# Patient Record
Sex: FEMALE | Race: WHITE | NOT HISPANIC OR LATINO | Employment: FULL TIME | URBAN - METROPOLITAN AREA
[De-identification: names, ages, dates, MRNs, and addresses within clinical notes are randomized per-mention and may not be internally consistent; named-entity substitution may affect disease eponyms.]

---

## 2017-04-26 ENCOUNTER — LAB REQUISITION (OUTPATIENT)
Dept: LAB | Facility: HOSPITAL | Age: 47
End: 2017-04-26
Payer: COMMERCIAL

## 2017-04-26 DIAGNOSIS — D64.9 ANEMIA: ICD-10-CM

## 2017-04-26 LAB
IRON SATN MFR SERPL: 25 %
IRON SERPL-MCNC: 86 UG/DL (ref 50–170)
TIBC SERPL-MCNC: 347 UG/DL (ref 250–450)

## 2017-04-26 PROCEDURE — 83550 IRON BINDING TEST: CPT | Performed by: INTERNAL MEDICINE

## 2017-04-26 PROCEDURE — 83516 IMMUNOASSAY NONANTIBODY: CPT | Performed by: INTERNAL MEDICINE

## 2017-04-26 PROCEDURE — 83540 ASSAY OF IRON: CPT | Performed by: INTERNAL MEDICINE

## 2017-04-26 PROCEDURE — 82784 ASSAY IGA/IGD/IGG/IGM EACH: CPT | Performed by: INTERNAL MEDICINE

## 2017-04-26 PROCEDURE — 86255 FLUORESCENT ANTIBODY SCREEN: CPT | Performed by: INTERNAL MEDICINE

## 2017-04-28 LAB
ENDOMYSIUM IGA SER QL: NEGATIVE
GLIADIN PEPTIDE IGA SER-ACNC: 3 UNITS (ref 0–19)
GLIADIN PEPTIDE IGG SER-ACNC: 2 UNITS (ref 0–19)
IGA SERPL-MCNC: 125 MG/DL (ref 87–352)
TTG IGA SER-ACNC: <2 U/ML (ref 0–3)
TTG IGG SER-ACNC: <2 U/ML (ref 0–5)

## 2017-05-23 PROCEDURE — 88305 TISSUE EXAM BY PATHOLOGIST: CPT | Performed by: INTERNAL MEDICINE

## 2017-05-24 ENCOUNTER — LAB REQUISITION (OUTPATIENT)
Dept: LAB | Facility: HOSPITAL | Age: 47
End: 2017-05-24
Payer: COMMERCIAL

## 2017-05-24 DIAGNOSIS — D64.9 ANEMIA: ICD-10-CM

## 2017-05-24 DIAGNOSIS — D12.4 BENIGN NEOPLASM OF DESCENDING COLON: ICD-10-CM

## 2017-05-24 DIAGNOSIS — K22.89 OTHER SPECIFIED DISEASES OF ESOPHAGUS: ICD-10-CM

## 2017-05-24 DIAGNOSIS — Z80.0 FAMILY HISTORY OF MALIGNANT NEOPLASM OF DIGESTIVE ORGAN: ICD-10-CM

## 2017-06-01 DIAGNOSIS — R92.8 OTHER ABNORMAL AND INCONCLUSIVE FINDINGS ON DIAGNOSTIC IMAGING OF BREAST: ICD-10-CM

## 2017-06-21 ENCOUNTER — TRANSCRIBE ORDERS (OUTPATIENT)
Dept: MAMMOGRAPHY | Facility: CLINIC | Age: 47
End: 2017-06-21

## 2017-06-21 ENCOUNTER — HOSPITAL ENCOUNTER (OUTPATIENT)
Dept: MAMMOGRAPHY | Facility: CLINIC | Age: 47
Discharge: HOME | End: 2017-06-21
Payer: COMMERCIAL

## 2017-06-21 DIAGNOSIS — N63.0 LUMP OR MASS IN BREAST: Primary | ICD-10-CM

## 2017-06-21 DIAGNOSIS — R92.8 OTHER ABNORMAL AND INCONCLUSIVE FINDINGS ON DIAGNOSTIC IMAGING OF BREAST: ICD-10-CM

## 2017-06-21 PROCEDURE — G0206 DX MAMMO INCL CAD UNI: HCPCS

## 2017-06-21 PROCEDURE — G0279 TOMOSYNTHESIS, MAMMO: HCPCS

## 2017-06-26 ENCOUNTER — GENERIC CONVERSION - ENCOUNTER (OUTPATIENT)
Dept: OTHER | Facility: OTHER | Age: 47
End: 2017-06-26

## 2017-11-29 ENCOUNTER — ALLSCRIPTS OFFICE VISIT (OUTPATIENT)
Dept: OTHER | Facility: OTHER | Age: 47
End: 2017-11-29

## 2017-11-30 NOTE — PROGRESS NOTES
Assessment    1  Encounter for gynecological examination () (Z01 419)    Discussion/Summary    The patient was informed of a stable gyn examination  She will continue to monitor her menstrual headaches  She may be a candidate for a laparoscopic oophorectomy for surgical menopause she may consider  Strongly of asked her to use her medication we the Fioricet or Percocet prior to onset of menses for prophylaxis  Her next mammogram should be and  she will need a 3D mammogram at that time  The patient has the current Goals: To continue to monitor menstrual headaches, to use prophylactic headache medication  To consider prophylactic bilateral surgical oophorectomy for menstrual headaches  The patent has the current Barriers: There are no barriers  Patient is able to Self-Care  Chief Complaint  Annual visit      History of Present Illness  HPI: This is a 59-year-old white female, she is a  2 para 2 with 2 prior vaginal deliveries  Her current method of contraception includes tubal ligation  She has a history of abnormal uterine bleeding this is treated by an endometrial ablation  Her cycles are now very light spotting  She does have a problem with menstrual headaches  She was seen by the neurologist yesterday  She is currently taking Fioricet and Percocet for headaches  We had a discussion about the possibility of laparoscopic removal of her ovaries to and induce a surgical menopause to help with menstrual headaches she may consider  There are no new major family illnesses report this time  There is no problem with intimacy  She denies any major  GI complaint  Upon further questioning she does admit to slight stress incontinence this is not interfering with her lifestyle  She will need to have another mammogram within the next 6 months  GYN HM, Adult Female [facility]: The patient is being seen for a gynecology evaluation  General Health:  The patient's health since the last visit is described as good  She has regular dental visits  -- She denies vision problems  -- She denies hearing loss  Lifestyle:  She consumes a diverse and healthy diet  -- She does not have any weight concerns  -- She exercises regularly  -- She uses tobacco  The patient is a former cigarette smoker  -- She consumes alcohol -- She denies drug use  Reproductive health: the patient is premenopausal--   she reports no menstrual problems  -- she uses contraception  For contraception, she has had a tubal occlusion  -- she is sexually active  Screening:      Review of Systems  Additional findings include Menstrual headaches  Constitutional: No fever, no chills, feels well, no tiredness, no recent weight gain or loss  ENT: no ear ache, no loss of hearing, no nosebleeds or nasal discharge, no sore throat or hoarseness  Cardiovascular: no complaints of slow or fast heart rate, no chest pain, no palpitations, no leg claudication or lower extremity edema  Respiratory: no complaints of shortness of breath, no wheezing, no dyspnea on exertion, no orthopnea or PND  Breasts: no complaints of breast pain, breast lump or nipple discharge  Gastrointestinal: no complaints of abdominal pain, no constipation, no nausea or diarrhea, no vomiting, no bloody stools  Genitourinary: no complaints of dysuria, no incontinence, no pelvic pain, no dysmenorrhea, no vaginal discharge or abnormal vaginal bleeding  Musculoskeletal: no complaints of arthralgia, no myalgia, no joint swelling or stiffness, no limb pain or swelling  Integumentary: no complaints of skin rash or lesion, no itching or dry skin, no skin wounds  Neurological: no complaints of headache, no confusion, no numbness or tingling, no dizziness or fainting  Over the past 2 weeks, how often have you been bothered by the following problems? 1 ) Little interest or pleasure in doing things? Not at all   2 ) Feeling down, depressed or hopeless?  Not at all   3 ) Trouble falling asleep or sleeping too much? Not at all   4 ) Feeling tired or having little energy? Not at all   5 ) Poor appetite or overeating? Not at all   6 ) Feeling bad about yourself, or that you are a failure, or have let yourself or your family down? Not at all   7 ) Trouble concentrating on things, such as reading a newspaper or watching television? Not at all   8 ) Moving or speaking so slowly that other people could have noticed, or the opposite, moving or speaking faster than usual? Not at all  How difficult have these problems made it for you to do your work, take care of things at home, or get along with people? Not at all  Score       OB History  Pregnancy History (Brief):  Prior pregnancies: : 2  Para: 2 (full-term)-- and-- 2 (living)  Delivery type: 2 vaginal       Active Problems  1  Abnormal findings on diagnostic imaging of breast (793 89) (R92 8)   2  Benign hypertension (401 1) (I10)   3  Encounter for gynecological examination (V72 31) (Z01 419)   4  Encounter for screening mammogram for breast cancer (V76 12) (Z12 31)   5  History of Hashimoto thyroiditis (V12 29) (Z86 39)   6   History of headache (V13 89) (Z87 898)    Surgical History   · History of Facial Surgery   · History of Hysteroscopy With Endometrial Ablation   · History of Thyroid Surgery Total Thyroidectomy   · History of Tubal Ligation    Family History  Mother    · Family history of abdominal aortic aneurysm (AAA) (V17 49) (Z82 49)   · Family history of arthritis (V17 7) (Z82 61)   · Family history of fibromyalgia (V17 89) (Z82 69)   · Family history of hypertension (V17 49) (Z82 49)   · Family history of osteoporosis (V17 81) (Z82 62)  Father    · Family history of GERD (V18 59) (Z83 79)   · Family history of Ulcer  Grandmother    · Family history of alcoholism (V17 0) (Z81 1)   · Family history of cancer (V16 9) (Z80 9)   · Family history of hepatic cirrhosis (V18 59) (Z83 79)   · Family history of stroke (V17 1) (Z82 3)  Grandfather    · Family history of heart attack (V17 3) (Z82 49)   · Family history of heart disease (V17 49) (Z82 49)   · Family history of skin cancer (V16 8) (Z80 8)   · Family history of stroke (V17 1) (Z82 3)    Social History     · Former smoker (V15 82) (O81 041)    Current Meds   1  Fioricet TABS; Therapy: (Recorded:02Nov2016) to Recorded   2  Levothyroxine Sodium TABS; Therapy: (Recorded:02Nov2016) to Recorded   3  Losartan Potassium 25 MG Oral Tablet; Therapy: (Recorded:02Nov2016) to Recorded   4  Percocet TABS; Therapy: (Recorded:02Nov2016) to Recorded   5  Restasis 0 05 % Ophthalmic Emulsion; Therapy: (Recorded:02Nov2016) to Recorded   6  Zanaflex TABS; Therapy: (Recorded:02Nov2016) to Recorded    Allergies  1  Levaquin TABS   2  Penicillins    Vitals   Recorded: [id number] 49:43KT   Systolic 405   Diastolic 60   Height 5 ft    Weight 109 lb 6 08 oz   BMI Calculated 21 36   BSA Calculated 1 44   LMP 23-Nov-2017       Physical Exam   Constitutional  General appearance: No acute distress, well appearing and well nourished  Neck  Neck: Normal, supple, trachea midline, no masses  Thyroid: Normal, no thyromegaly  Pulmonary  Respiratory effort: No increased work of breathing or signs of respiratory distress  Auscultation of lungs: Clear to auscultation  Cardiovascular  Auscultation of heart: Normal rate and rhythm, normal S1 and S2, no murmurs  Peripheral vascular exam: Normal pulses Throughout  Genitourinary  External genitalia: Normal and no lesions appreciated  Vagina: Normal, no lesions or dryness appreciated  Urethra: Normal    Urethral meatus: Normal    Bladder: Normal, soft, non-tender and no prolapse or masses appreciated  Cervix: Normal, no palpable masses  Uterus: Normal, non-tender, not enlarged, and no palpable masses  Adnexa/parametria: Normal, non-tender and no fullness or masses appreciated  Chest  Breasts: Normal and no dimpling or skin changes noted     Abdomen Abdomen: Normal, non-tender, and no organomegaly noted  Liver and spleen: No hepatomegaly or splenomegaly  Examination for hernias: No hernias appreciated  Lymphatic  Palpation of lymph nodes in neck, axillae, groin and/or other locations: No lymphadenopathy or masses noted  Skin  Skin and subcutaneous tissue: Normal skin turgor and no rashes     Palpation of skin and subcutaneous tissue: Normal    Psychiatric  Orientation to person, place, and time: Normal    Mood and affect: Normal        Signatures   Electronically signed by : DENIS Salgado ; Nov 29 2017  2:53PM EST                       (Author)

## 2017-12-01 DIAGNOSIS — Z12.31 ENCOUNTER FOR SCREENING MAMMOGRAM FOR MALIGNANT NEOPLASM OF BREAST: ICD-10-CM

## 2017-12-01 DIAGNOSIS — R92.8 OTHER ABNORMAL AND INCONCLUSIVE FINDINGS ON DIAGNOSTIC IMAGING OF BREAST: ICD-10-CM

## 2018-01-03 ENCOUNTER — HOSPITAL ENCOUNTER (OUTPATIENT)
Dept: MAMMOGRAPHY | Facility: CLINIC | Age: 48
Discharge: HOME | End: 2018-01-03
Payer: COMMERCIAL

## 2018-01-03 ENCOUNTER — GENERIC CONVERSION - ENCOUNTER (OUTPATIENT)
Dept: OTHER | Facility: OTHER | Age: 48
End: 2018-01-03

## 2018-01-03 ENCOUNTER — HOSPITAL ENCOUNTER (OUTPATIENT)
Dept: ULTRASOUND IMAGING | Facility: CLINIC | Age: 48
Discharge: HOME | End: 2018-01-03

## 2018-01-03 ENCOUNTER — TRANSCRIBE ORDERS (OUTPATIENT)
Dept: MAMMOGRAPHY | Facility: CLINIC | Age: 48
End: 2018-01-03

## 2018-01-03 DIAGNOSIS — R92.2 INCONCLUSIVE MAMMOGRAPHY: ICD-10-CM

## 2018-01-03 DIAGNOSIS — Z12.31 ENCOUNTER FOR SCREENING MAMMOGRAM FOR MALIGNANT NEOPLASM OF BREAST: ICD-10-CM

## 2018-01-03 DIAGNOSIS — R92.8 OTHER ABNORMAL AND INCONCLUSIVE FINDINGS ON DIAGNOSTIC IMAGING OF BREAST: ICD-10-CM

## 2018-01-03 DIAGNOSIS — R92.8 ABNORMAL MAMMOGRAM: Primary | ICD-10-CM

## 2018-01-03 PROCEDURE — 76642 ULTRASOUND BREAST LIMITED: CPT

## 2018-01-03 PROCEDURE — 77066 DX MAMMO INCL CAD BI: CPT

## 2018-01-03 PROCEDURE — G0279 TOMOSYNTHESIS, MAMMO: HCPCS

## 2018-01-14 VITALS
DIASTOLIC BLOOD PRESSURE: 60 MMHG | SYSTOLIC BLOOD PRESSURE: 110 MMHG | BODY MASS INDEX: 21.47 KG/M2 | HEIGHT: 60 IN | WEIGHT: 109.38 LBS

## 2018-05-02 ENCOUNTER — TRANSCRIBE ORDERS (OUTPATIENT)
Dept: ADMINISTRATIVE | Facility: HOSPITAL | Age: 48
End: 2018-05-02

## 2018-05-02 DIAGNOSIS — S53.22XA TRAUMATIC RUPTURE OF RADIAL COLLATERAL LIGAMENT OF LEFT ELBOW, INITIAL ENCOUNTER: Primary | ICD-10-CM

## 2018-05-12 ENCOUNTER — HOSPITAL ENCOUNTER (OUTPATIENT)
Dept: MRI IMAGING | Facility: HOSPITAL | Age: 48
Discharge: HOME | End: 2018-05-12
Payer: COMMERCIAL

## 2018-05-12 DIAGNOSIS — S53.22XA TRAUMATIC RUPTURE OF RADIAL COLLATERAL LIGAMENT OF LEFT ELBOW, INITIAL ENCOUNTER: ICD-10-CM

## 2018-05-12 PROCEDURE — 73218 MRI UPPER EXTREMITY W/O DYE: CPT

## 2018-07-11 ENCOUNTER — HOSPITAL ENCOUNTER (OUTPATIENT)
Dept: ULTRASOUND IMAGING | Facility: CLINIC | Age: 48
Discharge: HOME | End: 2018-07-11
Payer: COMMERCIAL

## 2018-07-11 ENCOUNTER — HOSPITAL ENCOUNTER (OUTPATIENT)
Dept: MAMMOGRAPHY | Facility: CLINIC | Age: 48
Discharge: HOME | End: 2018-07-11
Payer: COMMERCIAL

## 2018-07-11 ENCOUNTER — TRANSCRIBE ORDERS (OUTPATIENT)
Dept: MAMMOGRAPHY | Facility: CLINIC | Age: 48
End: 2018-07-11

## 2018-07-11 DIAGNOSIS — R92.8 ABNORMAL MAMMOGRAM: ICD-10-CM

## 2018-07-11 DIAGNOSIS — R92.8 ABNORMAL MAMMOGRAM: Primary | ICD-10-CM

## 2018-07-11 PROCEDURE — 77065 DX MAMMO INCL CAD UNI: CPT

## 2018-07-11 PROCEDURE — G0279 TOMOSYNTHESIS, MAMMO: HCPCS

## 2018-07-11 PROCEDURE — 76642 ULTRASOUND BREAST LIMITED: CPT

## 2019-02-06 ENCOUNTER — HOSPITAL ENCOUNTER (OUTPATIENT)
Dept: MAMMOGRAPHY | Facility: CLINIC | Age: 49
Discharge: HOME | End: 2019-02-06
Payer: COMMERCIAL

## 2019-02-06 ENCOUNTER — TRANSCRIBE ORDERS (OUTPATIENT)
Dept: MAMMOGRAPHY | Facility: CLINIC | Age: 49
End: 2019-02-06

## 2019-02-06 ENCOUNTER — HOSPITAL ENCOUNTER (OUTPATIENT)
Dept: ULTRASOUND IMAGING | Facility: CLINIC | Age: 49
Discharge: HOME | End: 2019-02-06
Payer: COMMERCIAL

## 2019-02-06 VITALS — BODY MASS INDEX: 20.81 KG/M2 | HEIGHT: 60 IN | WEIGHT: 106 LBS

## 2019-02-06 DIAGNOSIS — R92.8 ABNORMAL MAMMOGRAM: ICD-10-CM

## 2019-02-06 DIAGNOSIS — Z12.39 SCREENING BREAST EXAMINATION: Primary | ICD-10-CM

## 2019-02-06 PROCEDURE — G0279 TOMOSYNTHESIS, MAMMO: HCPCS

## 2019-02-06 PROCEDURE — 76642 ULTRASOUND BREAST LIMITED: CPT

## 2019-02-06 PROCEDURE — 77066 DX MAMMO INCL CAD BI: CPT

## 2019-05-15 ENCOUNTER — ANNUAL EXAM (OUTPATIENT)
Dept: OBGYN CLINIC | Facility: CLINIC | Age: 49
End: 2019-05-15
Payer: COMMERCIAL

## 2019-05-15 VITALS
DIASTOLIC BLOOD PRESSURE: 82 MMHG | WEIGHT: 112.2 LBS | BODY MASS INDEX: 22.03 KG/M2 | SYSTOLIC BLOOD PRESSURE: 120 MMHG | HEIGHT: 60 IN

## 2019-05-15 DIAGNOSIS — Z12.39 BREAST CANCER SCREENING: ICD-10-CM

## 2019-05-15 DIAGNOSIS — R92.8 ABNORMAL MAMMOGRAM: ICD-10-CM

## 2019-05-15 DIAGNOSIS — Z01.419 WOMEN'S ANNUAL ROUTINE GYNECOLOGICAL EXAMINATION: Primary | ICD-10-CM

## 2019-05-15 PROCEDURE — 99396 PREV VISIT EST AGE 40-64: CPT | Performed by: OBSTETRICS & GYNECOLOGY

## 2019-05-15 PROCEDURE — 87624 HPV HI-RISK TYP POOLED RSLT: CPT | Performed by: OBSTETRICS & GYNECOLOGY

## 2019-05-15 PROCEDURE — G0145 SCR C/V CYTO,THINLAYER,RESCR: HCPCS | Performed by: OBSTETRICS & GYNECOLOGY

## 2019-05-15 RX ORDER — OXYCODONE AND ACETAMINOPHEN 10; 325 MG/1; MG/1
TABLET ORAL
COMMUNITY

## 2019-05-15 RX ORDER — TIZANIDINE 4 MG/1
TABLET ORAL
COMMUNITY

## 2019-05-15 RX ORDER — PREDNISONE 20 MG/1
TABLET ORAL
Refills: 0 | COMMUNITY
Start: 2019-05-09

## 2019-05-15 RX ORDER — CYCLOSPORINE 0.5 MG/ML
EMULSION OPHTHALMIC
COMMUNITY

## 2019-05-15 RX ORDER — LEVOTHYROXINE SODIUM 112 UG/1
112 TABLET ORAL DAILY
Refills: 3 | COMMUNITY
Start: 2019-02-27

## 2019-05-15 RX ORDER — LOSARTAN POTASSIUM 25 MG/1
100 TABLET ORAL
COMMUNITY

## 2019-05-15 RX ORDER — ZOLMITRIPTAN 5 MG/1
SPRAY NASAL
COMMUNITY

## 2019-05-15 RX ORDER — BUTALBITAL, ACETAMINOPHEN AND CAFFEINE 50; 325; 40 MG/1; MG/1; MG/1
TABLET ORAL
COMMUNITY

## 2019-05-17 LAB
HPV HR 12 DNA CVX QL NAA+PROBE: NEGATIVE
HPV16 DNA CVX QL NAA+PROBE: NEGATIVE
HPV18 DNA CVX QL NAA+PROBE: NEGATIVE

## 2019-05-21 LAB
LAB AP GYN PRIMARY INTERPRETATION: NORMAL
LAB AP LMP: NORMAL
Lab: NORMAL

## 2019-11-30 ENCOUNTER — APPOINTMENT (OUTPATIENT)
Dept: RADIOLOGY | Facility: CLINIC | Age: 49
End: 2019-11-30
Payer: COMMERCIAL

## 2019-11-30 ENCOUNTER — TRANSCRIBE ORDERS (OUTPATIENT)
Dept: ADMINISTRATIVE | Facility: HOSPITAL | Age: 49
End: 2019-11-30

## 2019-11-30 DIAGNOSIS — R05.9 COUGH: ICD-10-CM

## 2019-11-30 DIAGNOSIS — R05.9 COUGH: Primary | ICD-10-CM

## 2019-11-30 PROCEDURE — 71046 X-RAY EXAM CHEST 2 VIEWS: CPT

## 2020-06-03 ENCOUNTER — HOSPITAL ENCOUNTER (OUTPATIENT)
Dept: MAMMOGRAPHY | Facility: CLINIC | Age: 50
Discharge: HOME | End: 2020-06-03
Payer: COMMERCIAL

## 2020-06-03 ENCOUNTER — HOSPITAL ENCOUNTER (OUTPATIENT)
Dept: ULTRASOUND IMAGING | Facility: CLINIC | Age: 50
Discharge: HOME | End: 2020-06-03
Payer: COMMERCIAL

## 2020-06-03 VITALS — TEMPERATURE: 97.8 F | HEIGHT: 60 IN | BODY MASS INDEX: 21.99 KG/M2 | WEIGHT: 112 LBS

## 2020-06-03 DIAGNOSIS — R92.8 ABNORMAL MAMMOGRAM: ICD-10-CM

## 2020-06-03 DIAGNOSIS — Z12.39 BREAST CANCER SCREENING: ICD-10-CM

## 2020-06-03 PROCEDURE — G0279 TOMOSYNTHESIS, MAMMO: HCPCS

## 2020-06-03 PROCEDURE — 76642 ULTRASOUND BREAST LIMITED: CPT

## 2020-06-03 PROCEDURE — 77066 DX MAMMO INCL CAD BI: CPT

## 2020-10-28 ENCOUNTER — ANNUAL EXAM (OUTPATIENT)
Dept: OBGYN CLINIC | Facility: CLINIC | Age: 50
End: 2020-10-28
Payer: COMMERCIAL

## 2020-10-28 VITALS
SYSTOLIC BLOOD PRESSURE: 110 MMHG | HEIGHT: 60 IN | WEIGHT: 120 LBS | BODY MASS INDEX: 23.56 KG/M2 | DIASTOLIC BLOOD PRESSURE: 74 MMHG | TEMPERATURE: 98.2 F

## 2020-10-28 DIAGNOSIS — Z12.31 ENCOUNTER FOR SCREENING MAMMOGRAM FOR MALIGNANT NEOPLASM OF BREAST: ICD-10-CM

## 2020-10-28 DIAGNOSIS — Z01.419 WOMEN'S ANNUAL ROUTINE GYNECOLOGICAL EXAMINATION: Primary | ICD-10-CM

## 2020-10-28 PROCEDURE — 99396 PREV VISIT EST AGE 40-64: CPT | Performed by: OBSTETRICS & GYNECOLOGY

## 2020-10-28 RX ORDER — BUDESONIDE AND FORMOTEROL FUMARATE DIHYDRATE 160; 4.5 UG/1; UG/1
AEROSOL RESPIRATORY (INHALATION)
COMMUNITY

## 2020-10-28 RX ORDER — FREMANEZUMAB-VFRM 225 MG/1.5ML
1.5 INJECTION SUBCUTANEOUS
COMMUNITY

## 2020-10-28 RX ORDER — LEVALBUTEROL TARTRATE 45 UG/1
AEROSOL, METERED ORAL
COMMUNITY

## 2021-04-21 ENCOUNTER — TRANSCRIBE ORDERS (OUTPATIENT)
Dept: ADMINISTRATIVE | Facility: HOSPITAL | Age: 51
End: 2021-04-21

## 2021-04-21 ENCOUNTER — HOSPITAL ENCOUNTER (OUTPATIENT)
Dept: NON INVASIVE DIAGNOSTICS | Age: 51
Discharge: HOME | End: 2021-04-21
Payer: COMMERCIAL

## 2021-04-21 DIAGNOSIS — R60.9 EDEMA: ICD-10-CM

## 2021-04-21 DIAGNOSIS — R60.9 EDEMA: Primary | ICD-10-CM

## 2021-04-21 PROCEDURE — 93970 EXTREMITY STUDY: CPT | Performed by: SURGERY

## 2021-04-21 PROCEDURE — 93970 EXTREMITY STUDY: CPT

## 2021-05-08 ENCOUNTER — OFFICE VISIT (OUTPATIENT)
Dept: URGENT CARE | Facility: CLINIC | Age: 51
End: 2021-05-08
Payer: COMMERCIAL

## 2021-05-08 VITALS — RESPIRATION RATE: 16 BRPM | OXYGEN SATURATION: 99 % | TEMPERATURE: 97.9 F | HEART RATE: 67 BPM

## 2021-05-08 DIAGNOSIS — J01.90 ACUTE NON-RECURRENT SINUSITIS, UNSPECIFIED LOCATION: Primary | ICD-10-CM

## 2021-05-08 PROCEDURE — 99213 OFFICE O/P EST LOW 20 MIN: CPT | Performed by: PHYSICIAN ASSISTANT

## 2021-05-08 RX ORDER — AZITHROMYCIN 250 MG/1
TABLET, FILM COATED ORAL
Qty: 6 TABLET | Refills: 0 | Status: SHIPPED | OUTPATIENT
Start: 2021-05-08 | End: 2021-05-13

## 2021-05-08 RX ORDER — FLUTICASONE PROPIONATE 50 MCG
2 SPRAY, SUSPENSION (ML) NASAL DAILY
Qty: 16 G | Refills: 0 | Status: SHIPPED | OUTPATIENT
Start: 2021-05-08

## 2021-05-08 NOTE — PATIENT INSTRUCTIONS

## 2021-05-08 NOTE — PROGRESS NOTES
[facility]        NAME: Eliana Art is a 48 y o  female  : 1970    MRN: [id number]  DATE:  May 8, 2021  TIME: 12:20 PM    Assessment and Plan   Acute non-recurrent sinusitis, unspecified location [J01 90]  1  Acute non-recurrent sinusitis, unspecified location  azithromycin (ZITHROMAX) 250 mg tablet    fluticasone (FLONASE) 50 mcg/act nasal spray         Patient Instructions     Discussed condition with pt  She has acute sinusitis which I will treat with an oral abx and Flonase and rec hydration, rest, discussed OTC cough/cold meds, and observation  Follow up with PCP in 3-5 days  Proceed to  ER if symptoms worsen  Chief Complaint     Chief Complaint   Patient presents with    Sore Throat     Starting  sore throat 4/10, puffy under eyes, slight nasal congestion, fatique, raspy voice, L ear pain, post nasal drip, body aches  21  Denies fever, chills, n/v/d, loss of appetite, taste or smell  Denies Covid exposure x 2 weeks  History of Present Illness        Patient presents with a 1 5 week history of sore throat, nasal congestion, ear pain, PND, hoarseness, fatigue, myalgias  Denies cough, shortness of breath, fever, chills, or known direct exposure to COVID-19  Has been managing symptoms with OTC meds  Has allergies but no asthma  Does not smoke  Review of Systems   Review of Systems   Constitutional: Positive for fatigue  Negative for chills and fever  HENT: Positive for congestion, ear pain, postnasal drip, sore throat and voice change  Respiratory: Negative  Cardiovascular: Negative  Gastrointestinal: Negative  Genitourinary: Negative  Musculoskeletal: Positive for myalgias  Current Medications       Current Outpatient Medications:     azithromycin (ZITHROMAX) 250 mg tablet, Take 2 tablets day 1 with food, then 1 tablet daily days 2-5 with food  , Disp: 6 tablet, Rfl: 0    budesonide-formoterol (Symbicort) 160-4 5 mcg/act inhaler, Symbicort 160 mcg-4 5 mcg/actuation HFA aerosol inhaler, Disp: , Rfl:     butalbital-acetaminophen-caffeine (FIORICET,ESGIC) -40 mg per tablet, Take by mouth, Disp: , Rfl:     cycloSPORINE (RESTASIS) 0 05 % ophthalmic emulsion, Apply to eye, Disp: , Rfl:     fluticasone (FLONASE) 50 mcg/act nasal spray, 2 sprays into each nostril daily, Disp: 16 g, Rfl: 0    fremanezumab-vfrm (Ajovy) 225 MG/1 5ML prefilled syringe, 1 5 mL, Disp: , Rfl:     levalbuterol (XOPENEX HFA) 45 mcg/act inhaler, levalbuterol HFA 45 mcg/actuation aerosol inhaler, Disp: , Rfl:     levothyroxine 112 mcg tablet, Take 112 mcg by mouth daily, Disp: , Rfl: 3    losartan (COZAAR) 25 mg tablet, Take by mouth, Disp: , Rfl:     oxyCODONE-acetaminophen (PERCOCET)  mg per tablet, Percocet 10 mg-325 mg tablet  take one tab by mouth every 8 hrs as needed for severe migraine, Disp: , Rfl:     predniSONE 20 mg tablet, , Disp: , Rfl: 0    tiZANidine (ZANAFLEX) 4 mg tablet, tizanidine 4 mg tablet  TAKE 1 1/2 TABLETS BY MOUTH AT BEDTIME, Disp: , Rfl:     ZOLMitriptan (ZOMIG) 5 MG nasal solution, Zomig 5 mg nasal spray  administer one spray in one nostril at onset of migraine, Disp: , Rfl:     Current Allergies     Allergies as of 05/08/2021 - Reviewed 05/08/2021   Allergen Reaction Noted    Levaquin [levofloxacin] Hives 02/06/2019    Penicillins Dermatitis 02/06/2019    Pertussis vaccines  02/06/2019            The following portions of the patient's history were reviewed and updated as appropriate: allergies, current medications, past family history, past medical history, past social history, past surgical history and problem list      Past Medical History:   Diagnosis Date    Hypertension     Hypothyroidism     Migraine        Past Surgical History:   Procedure Laterality Date    FACIAL LACERATIONS REPAIR      HAND NERVE REPAIR      THYROIDECTOMY      TONSILLECTOMY         Family History   Problem Relation Age of Onset  Basal cell carcinoma Mother     Vaginal cancer Maternal Grandmother     Basal cell carcinoma Maternal Grandfather     Colon cancer Maternal Uncle     No Known Problems Father     No Known Problems Sister     No Known Problems Daughter     No Known Problems Paternal Grandmother     No Known Problems Paternal Grandfather     No Known Problems Paternal Aunt          Medications have been verified  Objective   Pulse 67   Temp 97 9 °F (36 6 °C)   Resp 16   SpO2 99%   No LMP recorded  Physical Exam     Physical Exam  Vitals signs reviewed  Constitutional:       General: She is not in acute distress  Appearance: She is well-developed  HENT:      Right Ear: Hearing, tympanic membrane, ear canal and external ear normal       Left Ear: Hearing, tympanic membrane, ear canal and external ear normal       Nose: Mucosal edema (B/L boggy turbinates) and congestion present  Mouth/Throat:      Mouth: Mucous membranes are moist       Pharynx: Posterior oropharyngeal erythema (PND) present  No oropharyngeal exudate  Tonsils: No tonsillar exudate  Neck:      Musculoskeletal: Neck supple  Cardiovascular:      Rate and Rhythm: Normal rate and regular rhythm  Pulses: Normal pulses  Heart sounds: Normal heart sounds  No murmur  Pulmonary:      Effort: Pulmonary effort is normal  No respiratory distress  Breath sounds: Normal breath sounds  Lymphadenopathy:      Cervical: No cervical adenopathy  Neurological:      Mental Status: She is alert and oriented to person, place, and time

## 2021-07-28 ENCOUNTER — HOSPITAL ENCOUNTER (OUTPATIENT)
Dept: MAMMOGRAPHY | Facility: CLINIC | Age: 51
Discharge: HOME | End: 2021-07-28
Payer: COMMERCIAL

## 2021-07-28 VITALS — HEIGHT: 60 IN | WEIGHT: 115 LBS | BODY MASS INDEX: 22.58 KG/M2

## 2021-07-28 DIAGNOSIS — Z12.31 ENCOUNTER FOR SCREENING MAMMOGRAM FOR MALIGNANT NEOPLASM OF BREAST: ICD-10-CM

## 2021-07-28 PROCEDURE — 77063 BREAST TOMOSYNTHESIS BI: CPT

## 2021-07-28 PROCEDURE — 77067 SCR MAMMO BI INCL CAD: CPT

## 2021-11-03 ENCOUNTER — ANNUAL EXAM (OUTPATIENT)
Dept: OBGYN CLINIC | Facility: CLINIC | Age: 51
End: 2021-11-03
Payer: COMMERCIAL

## 2021-11-03 VITALS
HEIGHT: 60 IN | SYSTOLIC BLOOD PRESSURE: 110 MMHG | DIASTOLIC BLOOD PRESSURE: 80 MMHG | WEIGHT: 120.8 LBS | BODY MASS INDEX: 23.71 KG/M2

## 2021-11-03 DIAGNOSIS — Z01.419 WOMEN'S ANNUAL ROUTINE GYNECOLOGICAL EXAMINATION: Primary | ICD-10-CM

## 2021-11-03 DIAGNOSIS — Z12.31 ENCOUNTER FOR SCREENING MAMMOGRAM FOR MALIGNANT NEOPLASM OF BREAST: ICD-10-CM

## 2021-11-03 PROCEDURE — 99396 PREV VISIT EST AGE 40-64: CPT | Performed by: OBSTETRICS & GYNECOLOGY

## 2021-11-03 RX ORDER — MELATONIN
1000 DAILY
COMMUNITY

## 2021-11-03 RX ORDER — MULTIVITAMIN
1 TABLET ORAL DAILY
COMMUNITY

## 2022-02-05 ENCOUNTER — HOSPITAL ENCOUNTER (OUTPATIENT)
Dept: RADIOLOGY | Facility: HOSPITAL | Age: 52
Discharge: HOME | End: 2022-02-05
Payer: COMMERCIAL

## 2022-02-05 DIAGNOSIS — S22.22XA CLOSED FRACTURE OF BODY OF STERNUM, INITIAL ENCOUNTER: ICD-10-CM

## 2022-02-05 PROCEDURE — 71120 X-RAY EXAM BREASTBONE 2/>VWS: CPT

## 2022-02-12 ENCOUNTER — APPOINTMENT (OUTPATIENT)
Dept: LAB | Facility: HOSPITAL | Age: 52
End: 2022-02-12
Payer: COMMERCIAL

## 2022-02-12 DIAGNOSIS — E03.9 MYXEDEMA HEART DISEASE: ICD-10-CM

## 2022-02-12 DIAGNOSIS — E10.10 TYPE 1 DIABETES MELLITUS WITH KETOACIDOSIS WITHOUT COMA (HCC): ICD-10-CM

## 2022-02-12 DIAGNOSIS — I51.9 MYXEDEMA HEART DISEASE: ICD-10-CM

## 2022-02-12 DIAGNOSIS — E09.9 DRUG-INDUCED DIABETES MELLITUS (HCC): ICD-10-CM

## 2022-02-12 DIAGNOSIS — E06.3 CHRONIC LYMPHOCYTIC THYROIDITIS: ICD-10-CM

## 2022-02-12 LAB
ALBUMIN SERPL BCP-MCNC: 4 G/DL (ref 3.5–5)
ALP SERPL-CCNC: 37 U/L (ref 46–116)
ALT SERPL W P-5'-P-CCNC: 28 U/L (ref 12–78)
ANION GAP SERPL CALCULATED.3IONS-SCNC: 6 MMOL/L (ref 4–13)
AST SERPL W P-5'-P-CCNC: 16 U/L (ref 5–45)
BASOPHILS # BLD AUTO: 0.04 THOUSANDS/ΜL (ref 0–0.1)
BASOPHILS NFR BLD AUTO: 1 % (ref 0–1)
BILIRUB SERPL-MCNC: 0.8 MG/DL (ref 0.2–1)
BUN SERPL-MCNC: 14 MG/DL (ref 5–25)
CALCIUM SERPL-MCNC: 9 MG/DL (ref 8.3–10.1)
CHLORIDE SERPL-SCNC: 102 MMOL/L (ref 100–108)
CO2 SERPL-SCNC: 29 MMOL/L (ref 21–32)
CREAT SERPL-MCNC: 0.72 MG/DL (ref 0.6–1.3)
EOSINOPHIL # BLD AUTO: 0.12 THOUSAND/ΜL (ref 0–0.61)
EOSINOPHIL NFR BLD AUTO: 2 % (ref 0–6)
ERYTHROCYTE [DISTWIDTH] IN BLOOD BY AUTOMATED COUNT: 12.5 % (ref 11.6–15.1)
EST. AVERAGE GLUCOSE BLD GHB EST-MCNC: 103 MG/DL
GFR SERPL CREATININE-BSD FRML MDRD: 97 ML/MIN/1.73SQ M
GLUCOSE P FAST SERPL-MCNC: 90 MG/DL (ref 65–99)
HBA1C MFR BLD: 5.2 %
HCT VFR BLD AUTO: 38.1 % (ref 34.8–46.1)
HGB BLD-MCNC: 12.4 G/DL (ref 11.5–15.4)
IMM GRANULOCYTES # BLD AUTO: 0.02 THOUSAND/UL (ref 0–0.2)
IMM GRANULOCYTES NFR BLD AUTO: 0 % (ref 0–2)
LIPASE SERPL-CCNC: 142 U/L (ref 73–393)
LYMPHOCYTES # BLD AUTO: 2.08 THOUSANDS/ΜL (ref 0.6–4.47)
LYMPHOCYTES NFR BLD AUTO: 28 % (ref 14–44)
MAGNESIUM SERPL-MCNC: 2.2 MG/DL (ref 1.6–2.6)
MCH RBC QN AUTO: 32.2 PG (ref 26.8–34.3)
MCHC RBC AUTO-ENTMCNC: 32.5 G/DL (ref 31.4–37.4)
MCV RBC AUTO: 99 FL (ref 82–98)
MONOCYTES # BLD AUTO: 0.53 THOUSAND/ΜL (ref 0.17–1.22)
MONOCYTES NFR BLD AUTO: 7 % (ref 4–12)
NEUTROPHILS # BLD AUTO: 4.53 THOUSANDS/ΜL (ref 1.85–7.62)
NEUTS SEG NFR BLD AUTO: 62 % (ref 43–75)
NRBC BLD AUTO-RTO: 0 /100 WBCS
PHOSPHATE SERPL-MCNC: 3.3 MG/DL (ref 2.7–4.5)
PLATELET # BLD AUTO: 306 THOUSANDS/UL (ref 149–390)
PMV BLD AUTO: 10.1 FL (ref 8.9–12.7)
POTASSIUM SERPL-SCNC: 3.9 MMOL/L (ref 3.5–5.3)
PROT SERPL-MCNC: 7.1 G/DL (ref 6.4–8.2)
RBC # BLD AUTO: 3.85 MILLION/UL (ref 3.81–5.12)
SODIUM SERPL-SCNC: 137 MMOL/L (ref 136–145)
TSH SERPL DL<=0.05 MIU/L-ACNC: 0.62 UIU/ML (ref 0.36–3.74)
WBC # BLD AUTO: 7.32 THOUSAND/UL (ref 4.31–10.16)

## 2022-02-12 PROCEDURE — 80053 COMPREHEN METABOLIC PANEL: CPT

## 2022-02-12 PROCEDURE — 83036 HEMOGLOBIN GLYCOSYLATED A1C: CPT

## 2022-02-12 PROCEDURE — 36415 COLL VENOUS BLD VENIPUNCTURE: CPT

## 2022-02-12 PROCEDURE — 84100 ASSAY OF PHOSPHORUS: CPT

## 2022-02-12 PROCEDURE — 85025 COMPLETE CBC W/AUTO DIFF WBC: CPT

## 2022-02-12 PROCEDURE — 83735 ASSAY OF MAGNESIUM: CPT

## 2022-02-12 PROCEDURE — 84443 ASSAY THYROID STIM HORMONE: CPT

## 2022-02-12 PROCEDURE — 83690 ASSAY OF LIPASE: CPT

## 2022-02-12 PROCEDURE — 82308 ASSAY OF CALCITONIN: CPT

## 2022-02-14 LAB — CALCIT SERPL-MCNC: <2 PG/ML (ref 0–5)

## 2022-02-26 ENCOUNTER — APPOINTMENT (OUTPATIENT)
Dept: LAB | Facility: CLINIC | Age: 52
End: 2022-02-26
Payer: COMMERCIAL

## 2022-02-26 DIAGNOSIS — E89.0 POSTSURGICAL HYPOTHYROIDISM: ICD-10-CM

## 2022-02-26 DIAGNOSIS — E06.3 CHRONIC LYMPHOCYTIC THYROIDITIS: ICD-10-CM

## 2022-02-26 DIAGNOSIS — E66.8 OBESITY OF ENDOCRINE ORIGIN: ICD-10-CM

## 2022-02-26 LAB
CHOLEST SERPL-MCNC: 180 MG/DL
CORTIS AM PEAK SERPL-MCNC: 15.5 UG/DL (ref 4.2–22.4)
ESTRADIOL SERPL-MCNC: 67 PG/ML
FSH SERPL-ACNC: 6 MIU/ML
HDLC SERPL-MCNC: 76 MG/DL
LDLC SERPL CALC-MCNC: 95 MG/DL (ref 0–100)
LH SERPL-ACNC: 6.9 MIU/ML
NONHDLC SERPL-MCNC: 104 MG/DL
TESTOST SERPL-MCNC: 23 NG/DL
TRIGL SERPL-MCNC: 45 MG/DL

## 2022-02-26 PROCEDURE — 83835 ASSAY OF METANEPHRINES: CPT

## 2022-02-26 PROCEDURE — 82626 DEHYDROEPIANDROSTERONE: CPT

## 2022-02-26 PROCEDURE — 82024 ASSAY OF ACTH: CPT

## 2022-02-26 PROCEDURE — 83002 ASSAY OF GONADOTROPIN (LH): CPT

## 2022-02-26 PROCEDURE — 84244 ASSAY OF RENIN: CPT

## 2022-02-26 PROCEDURE — 82088 ASSAY OF ALDOSTERONE: CPT

## 2022-02-26 PROCEDURE — 82533 TOTAL CORTISOL: CPT

## 2022-02-26 PROCEDURE — 84403 ASSAY OF TOTAL TESTOSTERONE: CPT

## 2022-02-26 PROCEDURE — 80061 LIPID PANEL: CPT

## 2022-02-26 PROCEDURE — 83001 ASSAY OF GONADOTROPIN (FSH): CPT

## 2022-02-26 PROCEDURE — 36415 COLL VENOUS BLD VENIPUNCTURE: CPT

## 2022-02-26 PROCEDURE — 82670 ASSAY OF TOTAL ESTRADIOL: CPT

## 2022-03-01 LAB — ACTH PLAS-MCNC: 16.5 PG/ML (ref 7.2–63.3)

## 2022-03-02 LAB — DHEA SERPL-MCNC: 308 NG/DL (ref 31–701)

## 2022-03-04 LAB — ALDOST SERPL-MCNC: 29 NG/DL (ref 0–30)

## 2022-03-09 LAB
METANEPH FREE SERPL-MCNC: NORMAL PG/ML (ref 0–88)
NORMETANEPHRINE SERPL-MCNC: NORMAL PG/ML (ref 0–244)

## 2022-03-12 LAB
ALDOST SERPL-MCNC: 28.5 NG/DL (ref 0–30)
ALDOST/RENIN PLAS-RTO: 15 {RATIO} (ref 0–30)
RENIN PLAS-CCNC: 1.9 NG/ML/HR (ref 0.17–5.38)

## 2022-06-04 ENCOUNTER — APPOINTMENT (OUTPATIENT)
Dept: LAB | Facility: HOSPITAL | Age: 52
End: 2022-06-04
Payer: COMMERCIAL

## 2022-06-04 DIAGNOSIS — M25.50 PAIN IN JOINT, MULTIPLE SITES: ICD-10-CM

## 2022-06-04 LAB — ERYTHROCYTE [SEDIMENTATION RATE] IN BLOOD: 3 MM/HOUR (ref 0–29)

## 2022-06-04 PROCEDURE — 86200 CCP ANTIBODY: CPT

## 2022-06-04 PROCEDURE — 85652 RBC SED RATE AUTOMATED: CPT

## 2022-06-04 PROCEDURE — 86038 ANTINUCLEAR ANTIBODIES: CPT

## 2022-06-04 PROCEDURE — 86431 RHEUMATOID FACTOR QUANT: CPT

## 2022-06-04 PROCEDURE — 36415 COLL VENOUS BLD VENIPUNCTURE: CPT

## 2022-06-04 PROCEDURE — 86618 LYME DISEASE ANTIBODY: CPT

## 2022-06-06 LAB — ANA TITR SER IF: NEGATIVE {TITER}

## 2022-06-08 LAB — B BURGDOR IGG+IGM SER-ACNC: 27

## 2022-06-10 LAB
Lab: NORMAL
MISCELLANEOUS LAB TEST RESULT: NORMAL

## 2023-01-31 ENCOUNTER — OFFICE VISIT (OUTPATIENT)
Dept: URGENT CARE | Facility: CLINIC | Age: 53
End: 2023-01-31

## 2023-01-31 VITALS
RESPIRATION RATE: 16 BRPM | OXYGEN SATURATION: 98 % | SYSTOLIC BLOOD PRESSURE: 140 MMHG | DIASTOLIC BLOOD PRESSURE: 92 MMHG | HEART RATE: 68 BPM | TEMPERATURE: 97.7 F

## 2023-01-31 DIAGNOSIS — J01.10 ACUTE NON-RECURRENT FRONTAL SINUSITIS: Primary | ICD-10-CM

## 2023-01-31 RX ORDER — AMLODIPINE BESYLATE 2.5 MG/1
5 TABLET ORAL
COMMUNITY
Start: 2022-10-28

## 2023-01-31 RX ORDER — METHYLPREDNISOLONE 4 MG/1
TABLET ORAL
Qty: 21 TABLET | Refills: 0 | Status: SHIPPED | OUTPATIENT
Start: 2023-01-31

## 2023-01-31 RX ORDER — AZITHROMYCIN 250 MG/1
TABLET, FILM COATED ORAL
Qty: 6 TABLET | Refills: 0 | Status: SHIPPED | OUTPATIENT
Start: 2023-01-31 | End: 2023-02-04

## 2023-01-31 RX ORDER — CARVEDILOL 6.25 MG/1
TABLET ORAL
COMMUNITY

## 2023-02-01 NOTE — PROGRESS NOTES
[facility]        NAME: Carlos Burnham is a 46 y o  female  : 1970    MRN: [id number]  DATE: 2023  TIME: 7:27 PM    Assessment and Plan   Acute non-recurrent frontal sinusitis [J01 10]  1  Acute non-recurrent frontal sinusitis  azithromycin (ZITHROMAX) 250 mg tablet    methylPREDNISolone 4 MG tablet therapy pack            Patient Instructions       Follow up with PCP in 3-5 days  Proceed to  ER if symptoms worsen  Chief Complaint     Chief Complaint   Patient presents with   • Facial Pain     1 week:  nasal congestion, runny nose, facial burning, sinus pressure, post nasal drip with sore throat  Pt taking alkaseltzer cold medicine  History of Present Illness       54-year-old female with 1 week history of increased sinus pressure, head congestion and headaches  Reports no relief with over-the-counter products  Review of Systems   Review of Systems   Constitutional: Negative  HENT: Positive for congestion  Eyes: Negative  Respiratory: Negative  Cardiovascular: Negative  Gastrointestinal: Negative  Endocrine: Negative  Genitourinary: Negative  Musculoskeletal: Negative  Skin: Negative  Allergic/Immunologic: Negative  Neurological: Positive for headaches  Hematological: Negative  Psychiatric/Behavioral: Negative            Current Medications       Current Outpatient Medications:   •  azithromycin (ZITHROMAX) 250 mg tablet, Take 2 tablets today then 1 tablet daily x 4 days, Disp: 6 tablet, Rfl: 0  •  methylPREDNISolone 4 MG tablet therapy pack, Use as directed on package, Disp: 21 tablet, Rfl: 0  •  amLODIPine (NORVASC) 2 5 mg tablet, 5 mg, Disp: , Rfl:   •  budesonide-formoterol (Symbicort) 160-4 5 mcg/act inhaler, Symbicort 160 mcg-4 5 mcg/actuation HFA aerosol inhaler, Disp: , Rfl:   •  butalbital-acetaminophen-caffeine (FIORICET,ESGIC) -40 mg per tablet, Take by mouth, Disp: , Rfl:   •  CALCIUM PO, Take by mouth daily, Disp: , Rfl:   •  carvedilol (COREG) 6 25 mg tablet, carvedilol 6 25 mg tablet  TAKE 1 TABLET BY MOUTH TWICE A DAY, Disp: , Rfl:   •  cholecalciferol (VITAMIN D3) 1,000 units tablet, Take 1,000 Units by mouth daily, Disp: , Rfl:   •  cycloSPORINE (RESTASIS) 0 05 % ophthalmic emulsion, Apply to eye, Disp: , Rfl:   •  fluticasone (FLONASE) 50 mcg/act nasal spray, 2 sprays into each nostril daily, Disp: 16 g, Rfl: 0  •  fremanezumab-vfrm (Ajovy) 225 MG/1 5ML prefilled syringe, 1 5 mL, Disp: , Rfl:   •  levalbuterol (XOPENEX HFA) 45 mcg/act inhaler, levalbuterol HFA 45 mcg/actuation aerosol inhaler, Disp: , Rfl:   •  levothyroxine 112 mcg tablet, Take 112 mcg by mouth daily, Disp: , Rfl: 3  •  losartan (COZAAR) 25 mg tablet, Take 100 mg by mouth , Disp: , Rfl:   •  Multiple Vitamin (multivitamin) tablet, Take 1 tablet by mouth daily, Disp: , Rfl:   •  oxyCODONE-acetaminophen (PERCOCET)  mg per tablet, Percocet 10 mg-325 mg tablet  take one tab by mouth every 8 hrs as needed for severe migraine, Disp: , Rfl:   •  predniSONE 20 mg tablet, , Disp: , Rfl: 0  •  tiZANidine (ZANAFLEX) 4 mg tablet, tizanidine 4 mg tablet  TAKE 1 1/2 TABLETS BY MOUTH AT BEDTIME, Disp: , Rfl:   •  ZOLMitriptan (ZOMIG) 5 MG nasal solution, Zomig 5 mg nasal spray  administer one spray in one nostril at onset of migraine, Disp: , Rfl:     Current Allergies     Allergies as of 01/31/2023 - Reviewed 01/31/2023   Allergen Reaction Noted   • Levaquin [levofloxacin] Hives 02/06/2019   • Penicillins Dermatitis 02/06/2019   • Pertussis vaccines  02/06/2019            The following portions of the patient's history were reviewed and updated as appropriate: allergies, current medications, past family history, past medical history, past social history, past surgical history and problem list      Past Medical History:   Diagnosis Date   • Hypertension    • Hypothyroidism    • Migraine        Past Surgical History:   Procedure Laterality Date   • FACIAL LACERATIONS REPAIR     • HAND NERVE REPAIR     • THYROIDECTOMY     • TONSILLECTOMY         Family History   Problem Relation Age of Onset   • Basal cell carcinoma Mother    • Vaginal cancer Maternal Grandmother    • Basal cell carcinoma Maternal Grandfather    • Colon cancer Maternal Uncle    • No Known Problems Father    • No Known Problems Sister    • No Known Problems Daughter    • No Known Problems Paternal Grandmother    • No Known Problems Paternal Grandfather    • No Known Problems Paternal Aunt          Medications have been verified  Objective   /92   Pulse 68   Temp 97 7 °F (36 5 °C)   Resp 16   SpO2 98%   No LMP recorded  Physical Exam     Physical Exam  Vitals and nursing note reviewed  Constitutional:       Appearance: She is well-developed  HENT:      Head: Normocephalic  Nose: Congestion present  Eyes:      Pupils: Pupils are equal, round, and reactive to light  Cardiovascular:      Rate and Rhythm: Normal rate and regular rhythm  Pulmonary:      Effort: Pulmonary effort is normal    Musculoskeletal:         General: Normal range of motion  Skin:     General: Skin is warm and dry  Neurological:      Mental Status: She is alert and oriented to person, place, and time

## 2023-04-01 PROBLEM — J01.10 ACUTE NON-RECURRENT FRONTAL SINUSITIS: Status: RESOLVED | Noted: 2023-01-31 | Resolved: 2023-04-01

## 2023-06-28 ENCOUNTER — ANNUAL EXAM (OUTPATIENT)
Dept: OBGYN CLINIC | Facility: CLINIC | Age: 53
End: 2023-06-28
Payer: COMMERCIAL

## 2023-06-28 VITALS
DIASTOLIC BLOOD PRESSURE: 78 MMHG | SYSTOLIC BLOOD PRESSURE: 120 MMHG | HEIGHT: 60 IN | BODY MASS INDEX: 23.56 KG/M2 | WEIGHT: 120 LBS

## 2023-06-28 DIAGNOSIS — Z12.31 ENCOUNTER FOR SCREENING MAMMOGRAM FOR MALIGNANT NEOPLASM OF BREAST: Primary | ICD-10-CM

## 2023-06-28 DIAGNOSIS — Z01.419 WOMEN'S ANNUAL ROUTINE GYNECOLOGICAL EXAMINATION: ICD-10-CM

## 2023-06-28 DIAGNOSIS — Z11.3 SCREENING EXAMINATION FOR STD (SEXUALLY TRANSMITTED DISEASE): ICD-10-CM

## 2023-06-28 PROCEDURE — 99396 PREV VISIT EST AGE 40-64: CPT | Performed by: OBSTETRICS & GYNECOLOGY

## 2023-06-28 RX ORDER — GALCANEZUMAB 120 MG/ML
INJECTION, SOLUTION SUBCUTANEOUS
COMMUNITY

## 2023-06-28 NOTE — PROGRESS NOTES
Assessment/Plan:    Patient was informed of a stable perimenopausal GYN examination  A Pap smear was performed  She is also requesting STD screening  She is in the process of a divorce  She is not happy with her weight although her BMI is 23  She will continue to get yearly mammograms  Her colonoscopies are up-to-date  She does see a counselor as she deals with her divorce process  She feels safe at home  She should return to my office in 1 year  She will continue with her primary care physician for her hypertension and for endocrinologist for hypothyroidism  We made orders to get serology for STD screening  Subjective:      Patient ID: Terrell Smiley is a 46 y o  female  HPI    This is a 61-year-old white female, she is a  2 para 2 with 2 prior vaginal deliveries  Her current method of contraception includes tubal ligation  She is still having regular menstrual cycles  She has a history of hypertension and hypothyroidism  These are very controlled medically  She is complaining of some minimal pulse effects of hot flashes and night sweats  She is not sexually active  We talked about using over-the-counter Estroven  She may consider  Unfortunately she is in the process of a divorce  This process difficult for her  Her and her spouse are not communicating  She is requesting STD screening  She states she feels safe at home  She sees a dentist on a regular basis  She is not happy with her weight gain  There are no new major family illnesses to report  Her colonoscopy is up-to-date  She will continue getting yearly mammograms  She does admit to slight stress urine incontinence this is not interfering with her lifestyle          The following portions of the patient's history were reviewed and updated as appropriate: allergies, current medications, past family history, past medical history, past social history, past surgical history and problem list     Review of Systems All other systems reviewed and are negative  Objective:      /78   Ht 5' (1 524 m)   Wt 54 4 kg (120 lb)   LMP 06/19/2023 (Exact Date)   BMI 23 44 kg/m²           Physical Exam  Vitals reviewed  Exam conducted with a chaperone present  Constitutional:       Appearance: Normal appearance  She is normal weight  HENT:      Head: Normocephalic and atraumatic  Nose: Nose normal       Mouth/Throat:      Mouth: Mucous membranes are moist    Eyes:      Extraocular Movements: Extraocular movements intact  Pupils: Pupils are equal, round, and reactive to light  Cardiovascular:      Rate and Rhythm: Normal rate and regular rhythm  Pulses: Normal pulses  Heart sounds: Normal heart sounds  Pulmonary:      Effort: Pulmonary effort is normal       Breath sounds: Normal breath sounds  Chest:   Breasts:     Breasts are symmetrical       Right: Normal  No swelling, bleeding, inverted nipple, mass, nipple discharge or skin change  Left: Normal  No swelling, bleeding, inverted nipple, mass, nipple discharge or skin change  Abdominal:      General: Abdomen is flat  Bowel sounds are normal  There is no distension  Palpations: Abdomen is soft  There is no hepatomegaly, splenomegaly or mass  Tenderness: There is no abdominal tenderness  There is no guarding or rebound  Hernia: No hernia is present  There is no hernia in the left inguinal area or right inguinal area  Genitourinary:     General: Normal vulva  Pubic Area: No rash or pubic lice  Labia:         Right: No rash, tenderness, lesion or injury  Left: No rash, tenderness, lesion or injury  Urethra: No prolapse, urethral pain, urethral swelling or urethral lesion  Vagina: Normal  No signs of injury and foreign body  No vaginal discharge, erythema, tenderness, bleeding, lesions or prolapsed vaginal walls        Cervix: Normal       Uterus: Normal        Adnexa: Right adnexa normal  Rectum: Normal       Comments: The external genitalia normal limits the vagina is clean the uterus is retroverted normal size adnexa clear bilaterally  There is no evidence of prolapse  There is no cervical motion tenderness  A Pap smear was performed  We will also screen for GC and chlamydia  Musculoskeletal:      Cervical back: Normal range of motion and neck supple  Lymphadenopathy:      Lower Body: No right inguinal adenopathy  No left inguinal adenopathy  Skin:     General: Skin is warm and dry  Neurological:      General: No focal deficit present  Mental Status: She is alert and oriented to person, place, and time     Psychiatric:         Mood and Affect: Mood normal          Behavior: Behavior normal

## 2023-06-28 NOTE — PATIENT INSTRUCTIONS
The patient was informed of a stable perimenopausal GYN examination  I advise she use Estroven over-the-counter to help with her hot flashes and night sweats  If this is not effective gets worse we may refer her to our menopause specialist   She will continue get her mammograms  She will get her colonoscopies as needed  She should return my office in 1 year    She will be informed the results of her Pap smear and STD screening test

## 2023-06-29 LAB
C TRACH RRNA SPEC QL NAA+PROBE: NOT DETECTED
N GONORRHOEA RRNA SPEC QL NAA+PROBE: NOT DETECTED

## 2023-07-03 LAB
C TRACH RRNA SPEC QL NAA+PROBE: NOT DETECTED
CLINICAL INFO: NORMAL
CYTO CVX: NORMAL
CYTOLOGY CMNT CVX/VAG CYTO-IMP: NORMAL
DATE PREVIOUS BX: NORMAL
LMP START DATE: NORMAL
N GONORRHOEA RRNA SPEC QL NAA+PROBE: NOT DETECTED
SL AMB PREV. PAP:: NORMAL
SPECIMEN SOURCE CVX/VAG CYTO: NORMAL

## 2023-07-05 ENCOUNTER — APPOINTMENT (OUTPATIENT)
Dept: LAB | Facility: CLINIC | Age: 53
End: 2023-07-05
Payer: COMMERCIAL

## 2023-07-05 DIAGNOSIS — I51.9 MYXEDEMA HEART DISEASE: ICD-10-CM

## 2023-07-05 DIAGNOSIS — E34.9 ENDOCRINE DISORDER RELATED TO PUBERTY: ICD-10-CM

## 2023-07-05 DIAGNOSIS — E06.3 CHRONIC LYMPHOCYTIC THYROIDITIS: ICD-10-CM

## 2023-07-05 DIAGNOSIS — E03.9 MYXEDEMA HEART DISEASE: ICD-10-CM

## 2023-07-05 DIAGNOSIS — I10 ESSENTIAL HYPERTENSION, MALIGNANT: ICD-10-CM

## 2023-07-05 DIAGNOSIS — E89.0 POSTSURGICAL HYPOTHYROIDISM: ICD-10-CM

## 2023-07-05 DIAGNOSIS — Z11.3 SCREENING EXAMINATION FOR STD (SEXUALLY TRANSMITTED DISEASE): ICD-10-CM

## 2023-07-05 LAB
ALBUMIN SERPL BCP-MCNC: 4.4 G/DL (ref 3.5–5)
ALP SERPL-CCNC: 37 U/L (ref 46–116)
ALT SERPL W P-5'-P-CCNC: 22 U/L (ref 12–78)
ANION GAP SERPL CALCULATED.3IONS-SCNC: 1 MMOL/L
AST SERPL W P-5'-P-CCNC: 18 U/L (ref 5–45)
BASOPHILS # BLD AUTO: 0.05 THOUSANDS/ÂΜL (ref 0–0.1)
BASOPHILS NFR BLD AUTO: 1 % (ref 0–1)
BILIRUB SERPL-MCNC: 1.49 MG/DL (ref 0.2–1)
BUN SERPL-MCNC: 16 MG/DL (ref 5–25)
CALCIUM SERPL-MCNC: 9.4 MG/DL (ref 8.3–10.1)
CHLORIDE SERPL-SCNC: 109 MMOL/L (ref 96–108)
CO2 SERPL-SCNC: 25 MMOL/L (ref 21–32)
CREAT SERPL-MCNC: 0.74 MG/DL (ref 0.6–1.3)
EOSINOPHIL # BLD AUTO: 0.08 THOUSAND/ÂΜL (ref 0–0.61)
EOSINOPHIL NFR BLD AUTO: 1 % (ref 0–6)
ERYTHROCYTE [DISTWIDTH] IN BLOOD BY AUTOMATED COUNT: 12.3 % (ref 11.6–15.1)
GFR SERPL CREATININE-BSD FRML MDRD: 93 ML/MIN/1.73SQ M
GLUCOSE P FAST SERPL-MCNC: 95 MG/DL (ref 65–99)
HAV IGM SER QL: NORMAL
HBV CORE IGM SER QL: NORMAL
HBV SURFACE AG SER QL: NORMAL
HCT VFR BLD AUTO: 39.2 % (ref 34.8–46.1)
HCV AB SER QL: NORMAL
HGB BLD-MCNC: 13.5 G/DL (ref 11.5–15.4)
HIV 1+2 AB+HIV1 P24 AG SERPL QL IA: NORMAL
HIV 2 AB SERPL QL IA: NORMAL
HIV1 AB SERPL QL IA: NORMAL
HIV1 P24 AG SERPL QL IA: NORMAL
IMM GRANULOCYTES # BLD AUTO: 0.02 THOUSAND/UL (ref 0–0.2)
IMM GRANULOCYTES NFR BLD AUTO: 0 % (ref 0–2)
LYMPHOCYTES # BLD AUTO: 2.3 THOUSANDS/ÂΜL (ref 0.6–4.47)
LYMPHOCYTES NFR BLD AUTO: 34 % (ref 14–44)
MAGNESIUM SERPL-MCNC: 2.4 MG/DL (ref 1.6–2.6)
MCH RBC QN AUTO: 33.3 PG (ref 26.8–34.3)
MCHC RBC AUTO-ENTMCNC: 34.4 G/DL (ref 31.4–37.4)
MCV RBC AUTO: 97 FL (ref 82–98)
MONOCYTES # BLD AUTO: 0.49 THOUSAND/ÂΜL (ref 0.17–1.22)
MONOCYTES NFR BLD AUTO: 7 % (ref 4–12)
NEUTROPHILS # BLD AUTO: 3.88 THOUSANDS/ÂΜL (ref 1.85–7.62)
NEUTS SEG NFR BLD AUTO: 57 % (ref 43–75)
NRBC BLD AUTO-RTO: 0 /100 WBCS
PHOSPHATE SERPL-MCNC: 3.2 MG/DL (ref 2.7–4.5)
PLATELET # BLD AUTO: 348 THOUSANDS/UL (ref 149–390)
PMV BLD AUTO: 9.8 FL (ref 8.9–12.7)
POTASSIUM SERPL-SCNC: 4 MMOL/L (ref 3.5–5.3)
PROT SERPL-MCNC: 7.6 G/DL (ref 6.4–8.4)
RBC # BLD AUTO: 4.06 MILLION/UL (ref 3.81–5.12)
SODIUM SERPL-SCNC: 135 MMOL/L (ref 135–147)
T4 FREE SERPL-MCNC: 1.21 NG/DL (ref 0.61–1.12)
TREPONEMA PALLIDUM IGG+IGM AB [PRESENCE] IN SERUM OR PLASMA BY IMMUNOASSAY: NORMAL
TSH SERPL DL<=0.05 MIU/L-ACNC: 1.75 UIU/ML (ref 0.45–4.5)
WBC # BLD AUTO: 6.82 THOUSAND/UL (ref 4.31–10.16)

## 2023-07-05 PROCEDURE — 83735 ASSAY OF MAGNESIUM: CPT

## 2023-07-05 PROCEDURE — 86696 HERPES SIMPLEX TYPE 2 TEST: CPT | Performed by: OBSTETRICS & GYNECOLOGY

## 2023-07-05 PROCEDURE — 86695 HERPES SIMPLEX TYPE 1 TEST: CPT | Performed by: OBSTETRICS & GYNECOLOGY

## 2023-07-05 PROCEDURE — 80053 COMPREHEN METABOLIC PANEL: CPT

## 2023-07-05 PROCEDURE — 80074 ACUTE HEPATITIS PANEL: CPT | Performed by: OBSTETRICS & GYNECOLOGY

## 2023-07-05 PROCEDURE — 84100 ASSAY OF PHOSPHORUS: CPT

## 2023-07-05 PROCEDURE — 84443 ASSAY THYROID STIM HORMONE: CPT

## 2023-07-05 PROCEDURE — 87389 HIV-1 AG W/HIV-1&-2 AB AG IA: CPT

## 2023-07-05 PROCEDURE — 85025 COMPLETE CBC W/AUTO DIFF WBC: CPT

## 2023-07-05 PROCEDURE — 36415 COLL VENOUS BLD VENIPUNCTURE: CPT

## 2023-07-05 PROCEDURE — 86780 TREPONEMA PALLIDUM: CPT

## 2023-07-05 PROCEDURE — 84439 ASSAY OF FREE THYROXINE: CPT

## 2023-07-06 LAB
HSV1 IGG SER IA-ACNC: <0.91 INDEX (ref 0–0.9)
HSV2 IGG SER IA-ACNC: <0.91 INDEX (ref 0–0.9)

## 2023-11-14 ENCOUNTER — HOSPITAL ENCOUNTER (OUTPATIENT)
Dept: MRI IMAGING | Facility: HOSPITAL | Age: 53
Discharge: HOME | End: 2023-11-14
Attending: PSYCHIATRY & NEUROLOGY
Payer: COMMERCIAL

## 2023-11-14 DIAGNOSIS — G44.52 NEW DAILY PERSISTENT HEADACHE: ICD-10-CM

## 2023-11-14 PROCEDURE — 70551 MRI BRAIN STEM W/O DYE: CPT

## 2023-11-14 PROCEDURE — G1004 CDSM NDSC: HCPCS

## 2023-12-20 ENCOUNTER — APPOINTMENT (OUTPATIENT)
Dept: LAB | Facility: HOSPITAL | Age: 53
End: 2023-12-20
Payer: COMMERCIAL

## 2023-12-20 DIAGNOSIS — E89.0 POSTABLATIVE HYPOTHYROIDISM: ICD-10-CM

## 2023-12-20 DIAGNOSIS — E34.9 MULTIPLE ENDOCRINE DEFICIENCY SYNDROME: ICD-10-CM

## 2023-12-20 DIAGNOSIS — D34 HURTHLE CELL TUMOR: ICD-10-CM

## 2023-12-20 LAB
ALBUMIN SERPL BCP-MCNC: 4.2 G/DL (ref 3.5–5)
ALP SERPL-CCNC: 28 U/L (ref 34–104)
ALT SERPL W P-5'-P-CCNC: 11 U/L (ref 7–52)
ANION GAP SERPL CALCULATED.3IONS-SCNC: 6 MMOL/L
AST SERPL W P-5'-P-CCNC: 15 U/L (ref 13–39)
BASOPHILS # BLD AUTO: 0.04 THOUSANDS/ÂΜL (ref 0–0.1)
BASOPHILS NFR BLD AUTO: 1 % (ref 0–1)
BILIRUB SERPL-MCNC: 1.35 MG/DL (ref 0.2–1)
BUN SERPL-MCNC: 16 MG/DL (ref 5–25)
CALCIUM SERPL-MCNC: 9.1 MG/DL (ref 8.4–10.2)
CHLORIDE SERPL-SCNC: 103 MMOL/L (ref 96–108)
CO2 SERPL-SCNC: 25 MMOL/L (ref 21–32)
CORTIS AM PEAK SERPL-MCNC: 8.2 UG/DL (ref 6.7–22.6)
CREAT SERPL-MCNC: 0.63 MG/DL (ref 0.6–1.3)
EOSINOPHIL # BLD AUTO: 0.08 THOUSAND/ÂΜL (ref 0–0.61)
EOSINOPHIL NFR BLD AUTO: 1 % (ref 0–6)
ERYTHROCYTE [DISTWIDTH] IN BLOOD BY AUTOMATED COUNT: 12.7 % (ref 11.6–15.1)
EST. AVERAGE GLUCOSE BLD GHB EST-MCNC: 108 MG/DL
GFR SERPL CREATININE-BSD FRML MDRD: 102 ML/MIN/1.73SQ M
GLUCOSE P FAST SERPL-MCNC: 95 MG/DL (ref 65–99)
HBA1C MFR BLD: 5.4 %
HCT VFR BLD AUTO: 39.2 % (ref 34.8–46.1)
HGB BLD-MCNC: 12.9 G/DL (ref 11.5–15.4)
IMM GRANULOCYTES # BLD AUTO: 0.03 THOUSAND/UL (ref 0–0.2)
IMM GRANULOCYTES NFR BLD AUTO: 1 % (ref 0–2)
LYMPHOCYTES # BLD AUTO: 2.38 THOUSANDS/ÂΜL (ref 0.6–4.47)
LYMPHOCYTES NFR BLD AUTO: 38 % (ref 14–44)
MAGNESIUM SERPL-MCNC: 2.2 MG/DL (ref 1.9–2.7)
MCH RBC QN AUTO: 32.3 PG (ref 26.8–34.3)
MCHC RBC AUTO-ENTMCNC: 32.9 G/DL (ref 31.4–37.4)
MCV RBC AUTO: 98 FL (ref 82–98)
MONOCYTES # BLD AUTO: 0.48 THOUSAND/ÂΜL (ref 0.17–1.22)
MONOCYTES NFR BLD AUTO: 8 % (ref 4–12)
NEUTROPHILS # BLD AUTO: 3.33 THOUSANDS/ÂΜL (ref 1.85–7.62)
NEUTS SEG NFR BLD AUTO: 51 % (ref 43–75)
NRBC BLD AUTO-RTO: 0 /100 WBCS
PHOSPHATE SERPL-MCNC: 3 MG/DL (ref 2.7–4.5)
PLATELET # BLD AUTO: 339 THOUSANDS/UL (ref 149–390)
PMV BLD AUTO: 9.9 FL (ref 8.9–12.7)
POTASSIUM SERPL-SCNC: 3.7 MMOL/L (ref 3.5–5.3)
PROT SERPL-MCNC: 7 G/DL (ref 6.4–8.4)
RBC # BLD AUTO: 4 MILLION/UL (ref 3.81–5.12)
SODIUM SERPL-SCNC: 134 MMOL/L (ref 135–147)
T4 FREE SERPL-MCNC: 1.28 NG/DL (ref 0.61–1.12)
TSH SERPL DL<=0.05 MIU/L-ACNC: 0.94 UIU/ML (ref 0.45–4.5)
WBC # BLD AUTO: 6.34 THOUSAND/UL (ref 4.31–10.16)

## 2023-12-20 PROCEDURE — 83735 ASSAY OF MAGNESIUM: CPT

## 2023-12-20 PROCEDURE — 84100 ASSAY OF PHOSPHORUS: CPT

## 2023-12-20 PROCEDURE — 82533 TOTAL CORTISOL: CPT

## 2023-12-20 PROCEDURE — 82024 ASSAY OF ACTH: CPT

## 2023-12-20 PROCEDURE — 80053 COMPREHEN METABOLIC PANEL: CPT

## 2023-12-20 PROCEDURE — 85025 COMPLETE CBC W/AUTO DIFF WBC: CPT

## 2023-12-20 PROCEDURE — 84439 ASSAY OF FREE THYROXINE: CPT

## 2023-12-20 PROCEDURE — 84443 ASSAY THYROID STIM HORMONE: CPT

## 2023-12-20 PROCEDURE — 83036 HEMOGLOBIN GLYCOSYLATED A1C: CPT

## 2023-12-20 PROCEDURE — 36415 COLL VENOUS BLD VENIPUNCTURE: CPT

## 2023-12-21 LAB — ACTH PLAS-MCNC: 11.3 PG/ML (ref 7.2–63.3)

## 2024-07-03 ENCOUNTER — ANNUAL EXAM (OUTPATIENT)
Dept: OBGYN CLINIC | Facility: CLINIC | Age: 54
End: 2024-07-03
Payer: COMMERCIAL

## 2024-07-03 VITALS — WEIGHT: 111 LBS | SYSTOLIC BLOOD PRESSURE: 116 MMHG | DIASTOLIC BLOOD PRESSURE: 74 MMHG | BODY MASS INDEX: 21.68 KG/M2

## 2024-07-03 DIAGNOSIS — Z01.419 WOMEN'S ANNUAL ROUTINE GYNECOLOGICAL EXAMINATION: Primary | ICD-10-CM

## 2024-07-03 PROCEDURE — S0612 ANNUAL GYNECOLOGICAL EXAMINA: HCPCS | Performed by: OBSTETRICS & GYNECOLOGY

## 2024-07-03 RX ORDER — RIMEGEPANT SULFATE 75 MG/75MG
75 TABLET, ORALLY DISINTEGRATING ORAL
COMMUNITY

## 2024-07-03 NOTE — PATIENT INSTRUCTIONS
The patient was informed of a stable.  She consider using nonhormonal therapy for her complaints.  If no improvement she will let me know.  She is now happily .  She feels safe at home.  She sees a dentist on a regular basis.  She still sees neurology for her headaches.  She should return to my office in 1 year unless new issues occur.

## 2024-07-03 NOTE — PROGRESS NOTES
Ambulatory Visit  Name: Lin Parmar      : 1970      MRN: [id number]  Encounter Provider: Deandre Ledezma MD  Encounter Date: 7/3/2024   Encounter department: OB GYN [facility]    Assessment & Plan   The patient was informed of a stable perimenopausal GYN examination.  She is now  and may start dating again.  She is content with her weight.  She feels safe at home.  Colonoscopies are up-to-date.  She still been followed for neurology for her headaches she finally had a regimen is working well for her.  As far as her perimenopausal symptoms we gave her information about nonhormonal supplements she may use.  She read the brochure and I may consider ordering her own.  There are no new major family illnesses to report.  She denies any prior depression or anxiety.  She feels safe at home.  She will continue getting yearly mammograms.  She should return to my office in 1 year unless new issues occur.    History of Present Illness     Lin Parmar is a 53 y.o. female who presents for her annual GYN examination.  She is a  2 para 2 with 2 prior vaginal deliveries.  She is now .  She is contemplating dating again.  She is still having some occasional menstrual cycles.  She is troubled by hot flashes and night sweats.  She is under the care of neurology for headaches.  She is also has a history of hypertension.  She is on blood pressure medication.  She feels safe at home since her divorce.  Denies any prior depression or anxiety.  She continue get yearly mammograms.  Her colonoscopies are up-to-date she is getting another 7 years before her next one.  Does have a history of slight stress urine incontinence this is not interfering with her lifestyle the present time.  She is content with her weight loss.  She has a history of a tubal ligation.  There are no new major family illnesses to report.  She will get a Pap smear today.    Review of Systems   All other systems reviewed and are  negative.      Objective     /74   Wt 50.3 kg (111 lb)   LMP 07/01/2024 (Exact Date)   BMI 21.68 kg/m²     Physical Exam  Vitals reviewed. Exam conducted with a chaperone present.   Constitutional:       Appearance: Normal appearance. She is normal weight.   HENT:      Head: Normocephalic and atraumatic.      Nose: Nose normal.      Mouth/Throat:      Mouth: Mucous membranes are moist.   Eyes:      Extraocular Movements: Extraocular movements intact.      Pupils: Pupils are equal, round, and reactive to light.   Cardiovascular:      Pulses: Normal pulses.      Heart sounds: Normal heart sounds.   Pulmonary:      Effort: Pulmonary effort is normal.      Breath sounds: Normal breath sounds.   Chest:   Breasts:     Breasts are symmetrical.      Right: Normal. No swelling, bleeding, inverted nipple, mass, nipple discharge or skin change.      Left: Normal. No swelling, bleeding, inverted nipple, mass, nipple discharge or skin change.   Abdominal:      General: Abdomen is flat. Bowel sounds are normal.      Palpations: Abdomen is soft.      Tenderness: There is no abdominal tenderness.      Hernia: No hernia is present. There is no hernia in the umbilical area, ventral area, left inguinal area or right inguinal area.   Genitourinary:     General: Normal vulva.      Pubic Area: No rash or pubic lice.       Labia:         Right: No rash, tenderness, lesion or injury.         Left: No rash, tenderness, lesion or injury.       Vagina: Normal. No signs of injury and foreign body. No vaginal discharge, erythema, tenderness, bleeding, lesions or prolapsed vaginal walls.      Cervix: Normal. No cervical motion tenderness, discharge, friability, lesion, erythema, cervical bleeding or eversion.      Uterus: Normal.       Adnexa: Right adnexa normal and left adnexa normal.      Rectum: Normal.      Comments:   The external genitalia normal limits the vagina is clean the cervix is parous uterus is retroverted normal size.   A Pap smear was performed.  The adnexa clear bilaterally.  There is no evidence of prolapse.  Urethra and bladder normal working relationship.  Musculoskeletal:         General: Normal range of motion.      Cervical back: Normal range of motion and neck supple.   Lymphadenopathy:      Upper Body:      Right upper body: No supraclavicular adenopathy.      Left upper body: No supraclavicular adenopathy.   Skin:     General: Skin is warm and dry.   Neurological:      General: No focal deficit present.      Mental Status: She is alert and oriented to person, place, and time.   Psychiatric:         Mood and Affect: Mood normal.         Behavior: Behavior normal.       Administrative Statements

## 2024-07-06 LAB
CLINICAL INFO: NORMAL
CYTO CVX: NORMAL
CYTOLOGY CMNT CVX/VAG CYTO-IMP: NORMAL
DATE PREVIOUS BX: NORMAL
LMP START DATE: NORMAL
SL AMB PREV. PAP:: NORMAL
SPECIMEN SOURCE CVX/VAG CYTO: NORMAL

## 2024-07-17 ENCOUNTER — HOSPITAL ENCOUNTER (OUTPATIENT)
Dept: MAMMOGRAPHY | Facility: IMAGING CENTER | Age: 54
Discharge: HOME | End: 2024-07-17
Payer: COMMERCIAL

## 2024-07-17 VITALS — WEIGHT: 106 LBS | HEIGHT: 60 IN | BODY MASS INDEX: 20.81 KG/M2

## 2024-07-17 DIAGNOSIS — Z12.31 ENCOUNTER FOR SCREENING MAMMOGRAM FOR MALIGNANT NEOPLASM OF BREAST: ICD-10-CM

## 2024-07-17 PROCEDURE — 77063 BREAST TOMOSYNTHESIS BI: CPT

## 2024-07-17 PROCEDURE — 77067 SCR MAMMO BI INCL CAD: CPT

## 2024-11-20 LAB — HBA1C MFR BLD HPLC: 5.3 %

## 2025-01-14 ENCOUNTER — OFFICE VISIT (OUTPATIENT)
Dept: GASTROENTEROLOGY | Facility: CLINIC | Age: 55
End: 2025-01-14
Payer: COMMERCIAL

## 2025-01-14 ENCOUNTER — TELEPHONE (OUTPATIENT)
Dept: GASTROENTEROLOGY | Facility: CLINIC | Age: 55
End: 2025-01-14

## 2025-01-14 VITALS
BODY MASS INDEX: 23.03 KG/M2 | DIASTOLIC BLOOD PRESSURE: 72 MMHG | SYSTOLIC BLOOD PRESSURE: 119 MMHG | HEIGHT: 61 IN | WEIGHT: 122 LBS

## 2025-01-14 DIAGNOSIS — K62.5 RECTAL BLEEDING: Primary | ICD-10-CM

## 2025-01-14 DIAGNOSIS — Z86.0101 PERSONAL HISTORY OF ADENOMATOUS AND SERRATED COLON POLYPS: ICD-10-CM

## 2025-01-14 PROBLEM — J45.909 ASTHMA: Status: ACTIVE | Noted: 2025-01-14

## 2025-01-14 PROBLEM — E03.9 HYPOTHYROID: Status: ACTIVE | Noted: 2025-01-14

## 2025-01-14 PROBLEM — I10 HTN (HYPERTENSION): Status: ACTIVE | Noted: 2025-01-14

## 2025-01-14 PROBLEM — G43.909 MIGRAINES: Status: ACTIVE | Noted: 2025-01-14

## 2025-01-14 PROCEDURE — 99204 OFFICE O/P NEW MOD 45 MIN: CPT | Performed by: INTERNAL MEDICINE

## 2025-01-14 RX ORDER — POLYETHYLENE GLYCOL 3350 17 G/17G
238 POWDER, FOR SOLUTION ORAL ONCE
Qty: 238 G | Refills: 0 | Status: SHIPPED | OUTPATIENT
Start: 2025-01-14 | End: 2025-01-22 | Stop reason: HOSPADM

## 2025-01-14 RX ORDER — SODIUM CHLORIDE, SODIUM LACTATE, POTASSIUM CHLORIDE, CALCIUM CHLORIDE 600; 310; 30; 20 MG/100ML; MG/100ML; MG/100ML; MG/100ML
125 INJECTION, SOLUTION INTRAVENOUS CONTINUOUS
Status: CANCELLED | OUTPATIENT
Start: 2025-01-14

## 2025-01-14 NOTE — PROGRESS NOTES
Name: Lin Parmar      : 1970      MRN: [id number]  Encounter Provider: Ana Deleon MD  Encounter Date: 2025   Encounter department: [facility]  :  Assessment & Plan  Rectal bleeding  54-year-old female here today as a new patient at the request of her PCP for rectal bleeding.  Sounds like hemorrhoidal, however given this is a new issue without any significant constipation or diarrhea, will proceed with colonoscopy to further assess.  Differential diagnosis includes hemorrhoids versus possible rectal polyp, rectal ulcerations.  Will rule out inflammatory bowel disease.    In the meantime, recommend high-fiber diet, increase water intake, okay to try some Preparation H for now.    Orders:  •  Colonoscopy; Future  •  polyethylene glycol (MiraLax) 17 GM/SCOOP powder; Take 238 g by mouth once for 1 dose Take 238 g my mouth. Use as directed    Personal history of adenomatous and serrated colon polyps  History of colon adenoma in .  Negative colonoscopy in  at outside hospital GI office.           History of Present Illness     Lin Parmar is a 54 y.o. female who presents today at the request of her PCP as a new patient for rectal bleeding.  Patient states that she normally has regular bowel movements without significant constipation or diarrhea.  Denies any abdominal pains.  However in the past 2 months, she has had several episodes of bright red blood per rectum with bowel movement.  Sometimes even without straining.  Denies any weight loss or nausea or vomiting.    History obtained from: patient    Review of Systems   Constitutional:  Negative for chills and fever.   HENT:  Negative for ear pain and sore throat.    Eyes:  Negative for pain and visual disturbance.   Respiratory:  Negative for cough and shortness of breath.    Cardiovascular:  Negative for chest pain and palpitations.   Gastrointestinal:  Negative for abdominal pain and  vomiting.   Genitourinary:  Negative for dysuria and hematuria.   Musculoskeletal:  Negative for arthralgias and back pain.   Skin:  Negative for color change and rash.   Neurological:  Negative for seizures and syncope.   All other systems reviewed and are negative.    Past Medical History   Past Medical History:   Diagnosis Date   • Hypertension    • Hypothyroidism    • Migraine    • Personal history of adenomatous and serrated colon polyps      Past Surgical History:   Procedure Laterality Date   • COLONOSCOPY      2017, 2021   • FACIAL LACERATIONS REPAIR     • HAND NERVE REPAIR     • LAPAROSCOPY  1994   • THYROIDECTOMY     • TONSILLECTOMY     • UPPER GASTROINTESTINAL ENDOSCOPY  2017    normal biopsies     Family History   Problem Relation Age of Onset   • Basal cell carcinoma Mother         40's   • No Known Problems Father    • No Known Problems Sister    • Vaginal cancer Maternal Grandmother 87   • Basal cell carcinoma Maternal Grandfather         Unknown age   • Squamous cell carcinoma Maternal Grandfather         Unknown age   • No Known Problems Paternal Grandmother    • No Known Problems Paternal Grandfather    • Colon cancer Maternal Uncle         50's   • No Known Problems Paternal Aunt    • No Known Problems Daughter    • Colon polyps Neg Hx       reports that she quit smoking about 35 years ago. Her smoking use included cigarettes. She has never used smokeless tobacco. She reports current alcohol use. She reports that she does not use drugs.  Current Outpatient Medications on File Prior to Visit   Medication Sig Dispense Refill   • amLODIPine (NORVASC) 2.5 mg tablet 5 mg     • budesonide-formoterol (Symbicort) 160-4.5 mcg/act inhaler      • butalbital-acetaminophen-caffeine (FIORICET,ESGIC) -40 mg per tablet Take by mouth     • CALCIUM PO Take by mouth daily     • carvedilol (COREG) 6.25 mg tablet      • cholecalciferol (VITAMIN D3) 1,000 units tablet Take 1,000 Units by mouth daily     •  cycloSPORINE (RESTASIS) 0.05 % ophthalmic emulsion Apply to eye     • fluticasone (FLONASE) 50 mcg/act nasal spray 2 sprays into each nostril daily 16 g 0   • Galcanezumab-gnlm (Emgality) 120 MG/ML SOAJ Inject under the skin     • levalbuterol (XOPENEX HFA) 45 mcg/act inhaler      • levothyroxine 112 mcg tablet Take 112 mcg by mouth daily  3   • losartan (COZAAR) 25 mg tablet Take 100 mg by mouth      • Multiple Vitamin (multivitamin) tablet Take 1 tablet by mouth daily     • rimegepant sulfate (Nurtec) 75 mg TBDP Take 75 mg by mouth     • tiZANidine (ZANAFLEX) 4 mg tablet tizanidine 4 mg tablet   TAKE 1 1/2 TABLETS BY MOUTH AT BEDTIME     • [DISCONTINUED] methylPREDNISolone 4 MG tablet therapy pack Use as directed on package 21 tablet 0   • [DISCONTINUED] fremanezumab-vfrm (Ajovy) 225 MG/1.5ML prefilled syringe 1.5 mL (Patient not taking: Reported on 6/28/2023)     • [DISCONTINUED] oxyCODONE-acetaminophen (PERCOCET)  mg per tablet  (Patient not taking: Reported on 1/14/2025)     • [DISCONTINUED] predniSONE 20 mg tablet  (Patient not taking: Reported on 6/28/2023)  0   • [DISCONTINUED] ZOLMitriptan (ZOMIG) 5 MG nasal solution Zomig 5 mg nasal spray   administer one spray in one nostril at onset of migraine (Patient not taking: Reported on 6/28/2023)       No current facility-administered medications on file prior to visit.     Allergies   Allergen Reactions   • Levaquin [Levofloxacin] Hives   • Penicillins Dermatitis   • Pertussis Vaccines       Current Outpatient Medications on File Prior to Visit   Medication Sig Dispense Refill   • amLODIPine (NORVASC) 2.5 mg tablet 5 mg     • budesonide-formoterol (Symbicort) 160-4.5 mcg/act inhaler      • butalbital-acetaminophen-caffeine (FIORICET,ESGIC) -40 mg per tablet Take by mouth     • CALCIUM PO Take by mouth daily     • carvedilol (COREG) 6.25 mg tablet      • cholecalciferol (VITAMIN D3) 1,000 units tablet Take 1,000 Units by mouth daily     • cycloSPORINE  "(RESTASIS) 0.05 % ophthalmic emulsion Apply to eye     • fluticasone (FLONASE) 50 mcg/act nasal spray 2 sprays into each nostril daily 16 g 0   • Galcanezumab-gnlm (Emgality) 120 MG/ML SOAJ Inject under the skin     • levalbuterol (XOPENEX HFA) 45 mcg/act inhaler      • levothyroxine 112 mcg tablet Take 112 mcg by mouth daily  3   • losartan (COZAAR) 25 mg tablet Take 100 mg by mouth      • Multiple Vitamin (multivitamin) tablet Take 1 tablet by mouth daily     • rimegepant sulfate (Nurtec) 75 mg TBDP Take 75 mg by mouth     • tiZANidine (ZANAFLEX) 4 mg tablet tizanidine 4 mg tablet   TAKE 1 1/2 TABLETS BY MOUTH AT BEDTIME     • [DISCONTINUED] methylPREDNISolone 4 MG tablet therapy pack Use as directed on package 21 tablet 0   • [DISCONTINUED] fremanezumab-vfrm (Ajovy) 225 MG/1.5ML prefilled syringe 1.5 mL (Patient not taking: Reported on 2023)     • [DISCONTINUED] oxyCODONE-acetaminophen (PERCOCET)  mg per tablet  (Patient not taking: Reported on 2025)     • [DISCONTINUED] predniSONE 20 mg tablet  (Patient not taking: Reported on 2023)  0   • [DISCONTINUED] ZOLMitriptan (ZOMIG) 5 MG nasal solution Zomig 5 mg nasal spray   administer one spray in one nostril at onset of migraine (Patient not taking: Reported on 2023)       No current facility-administered medications on file prior to visit.      Social History     Tobacco Use   • Smoking status: Former     Current packs/day: 0.00     Types: Cigarettes     Quit date:      Years since quittin.0   • Smokeless tobacco: Never   Vaping Use   • Vaping status: Never Used   Substance and Sexual Activity   • Alcohol use: Yes     Comment: social   • Drug use: Never   • Sexual activity: Yes     Partners: Male     Birth control/protection: Female Sterilization        Objective   /72   Ht 5' 1\" (1.549 m)   Wt 55.3 kg (122 lb)   BMI 23.05 kg/m²      Physical Exam  Vitals and nursing note reviewed.   Constitutional:       General: She is " not in acute distress.     Appearance: She is well-developed.   HENT:      Head: Normocephalic and atraumatic.   Eyes:      Conjunctiva/sclera: Conjunctivae normal.   Cardiovascular:      Rate and Rhythm: Normal rate and regular rhythm.      Heart sounds: No murmur heard.  Pulmonary:      Effort: Pulmonary effort is normal. No respiratory distress.      Breath sounds: Normal breath sounds.   Abdominal:      General: Abdomen is flat. Bowel sounds are normal. There is no distension.      Palpations: Abdomen is soft.      Tenderness: There is no abdominal tenderness.   Musculoskeletal:         General: No swelling.      Cervical back: Neck supple.   Skin:     General: Skin is warm and dry.   Neurological:      General: No focal deficit present.      Mental Status: She is alert and oriented to person, place, and time.   Psychiatric:         Mood and Affect: Mood normal.

## 2025-01-14 NOTE — ASSESSMENT & PLAN NOTE
54-year-old female here today as a new patient at the request of her PCP for rectal bleeding.  Sounds like hemorrhoidal, however given this is a new issue without any significant constipation or diarrhea, will proceed with colonoscopy to further assess.  Differential diagnosis includes hemorrhoids versus possible rectal polyp, rectal ulcerations.  Will rule out inflammatory bowel disease.    In the meantime, recommend high-fiber diet, increase water intake, okay to try some Preparation H for now.    Orders:  •  Colonoscopy; Future  •  polyethylene glycol (MiraLax) 17 GM/SCOOP powder; Take 238 g by mouth once for 1 dose Take 238 g my mouth. Use as directed

## 2025-01-14 NOTE — TELEPHONE ENCOUNTER
Scheduled date of colonoscopy (as of today):01/22/25  Physician performing colonoscopy:Shin  Location of colonoscopy:[facility]  Bowel prep reviewed with patient:miralax  Instructions reviewed with patient by: verbal and folder given - DS  Clearances: NONE

## 2025-01-20 ENCOUNTER — ANESTHESIA EVENT (OUTPATIENT)
Dept: ANESTHESIOLOGY | Facility: AMBULATORY SURGERY CENTER | Age: 55
End: 2025-01-20

## 2025-01-20 ENCOUNTER — ANESTHESIA (OUTPATIENT)
Dept: ANESTHESIOLOGY | Facility: AMBULATORY SURGERY CENTER | Age: 55
End: 2025-01-20

## 2025-01-20 ENCOUNTER — TELEPHONE (OUTPATIENT)
Dept: GASTROENTEROLOGY | Facility: CLINIC | Age: 55
End: 2025-01-20

## 2025-01-20 NOTE — TELEPHONE ENCOUNTER
Procedure confirmed  Colonoscopy     Via: [employer]    Instructions given: Given to Patient at Visit     Prep Given: Miralax/Dulcolax    Call the office if there are any questions.

## 2025-01-22 ENCOUNTER — HOSPITAL ENCOUNTER (OUTPATIENT)
Dept: GASTROENTEROLOGY | Facility: AMBULATORY SURGERY CENTER | Age: 55
Discharge: HOME | End: 2025-01-22
Attending: INTERNAL MEDICINE
Payer: COMMERCIAL

## 2025-01-22 ENCOUNTER — ANESTHESIA (OUTPATIENT)
Dept: GASTROENTEROLOGY | Facility: AMBULATORY SURGERY CENTER | Age: 55
End: 2025-01-22

## 2025-01-22 ENCOUNTER — ANESTHESIA EVENT (OUTPATIENT)
Dept: GASTROENTEROLOGY | Facility: AMBULATORY SURGERY CENTER | Age: 55
End: 2025-01-22

## 2025-01-22 VITALS
RESPIRATION RATE: 17 BRPM | OXYGEN SATURATION: 100 % | WEIGHT: 122 LBS | DIASTOLIC BLOOD PRESSURE: 70 MMHG | BODY MASS INDEX: 23.03 KG/M2 | HEART RATE: 78 BPM | HEIGHT: 61 IN | SYSTOLIC BLOOD PRESSURE: 105 MMHG | TEMPERATURE: 98.1 F

## 2025-01-22 DIAGNOSIS — K62.5 RECTAL BLEEDING: ICD-10-CM

## 2025-01-22 PROCEDURE — 45378 DIAGNOSTIC COLONOSCOPY: CPT | Performed by: INTERNAL MEDICINE

## 2025-01-22 RX ORDER — SODIUM CHLORIDE, SODIUM LACTATE, POTASSIUM CHLORIDE, CALCIUM CHLORIDE 600; 310; 30; 20 MG/100ML; MG/100ML; MG/100ML; MG/100ML
125 INJECTION, SOLUTION INTRAVENOUS CONTINUOUS
Status: DISCONTINUED | OUTPATIENT
Start: 2025-01-22 | End: 2025-01-26 | Stop reason: HOSPADM

## 2025-01-22 RX ORDER — PROPOFOL 10 MG/ML
INJECTION, EMULSION INTRAVENOUS AS NEEDED
Status: DISCONTINUED | OUTPATIENT
Start: 2025-01-22 | End: 2025-01-22

## 2025-01-22 RX ORDER — SODIUM CHLORIDE, SODIUM LACTATE, POTASSIUM CHLORIDE, CALCIUM CHLORIDE 600; 310; 30; 20 MG/100ML; MG/100ML; MG/100ML; MG/100ML
INJECTION, SOLUTION INTRAVENOUS CONTINUOUS PRN
Status: DISCONTINUED | OUTPATIENT
Start: 2025-01-22 | End: 2025-01-22

## 2025-01-22 RX ADMIN — PROPOFOL 50 MG: 10 INJECTION, EMULSION INTRAVENOUS at 09:41

## 2025-01-22 RX ADMIN — SODIUM CHLORIDE, SODIUM LACTATE, POTASSIUM CHLORIDE, CALCIUM CHLORIDE 500 ML: 600; 310; 30; 20 INJECTION, SOLUTION INTRAVENOUS at 09:18

## 2025-01-22 RX ADMIN — SODIUM CHLORIDE, SODIUM LACTATE, POTASSIUM CHLORIDE, CALCIUM CHLORIDE: 600; 310; 30; 20 INJECTION, SOLUTION INTRAVENOUS at 09:13

## 2025-01-22 RX ADMIN — PROPOFOL 50 MG: 10 INJECTION, EMULSION INTRAVENOUS at 09:44

## 2025-01-22 RX ADMIN — PROPOFOL 80 MG: 10 INJECTION, EMULSION INTRAVENOUS at 09:40

## 2025-01-22 RX ADMIN — PROPOFOL 20 MG: 10 INJECTION, EMULSION INTRAVENOUS at 09:47

## 2025-01-22 NOTE — H&P
History and Physical - [facility]    Lin Parmar 54 y.o. female MRN: [id number]      HPI: Lin Parmar is a 54 y.o. female who presents for rectal bleeding.    Allergies   Allergen Reactions    Levaquin [Levofloxacin] Hives    Penicillins Dermatitis    Pertussis Vaccines          REVIEW OF SYSTEMS: Per the HPI, and otherwise unremarkable.    Historical Information     Past Medical History:   Diagnosis Date    Asthma     Hypertension     Hypothyroidism     Migraine     Personal history of adenomatous and serrated colon polyps      Past Surgical History:   Procedure Laterality Date    COLONOSCOPY      2021    FACIAL LACERATIONS REPAIR      HAND NERVE REPAIR      LAPAROSCOPY      THYROIDECTOMY      TONSILLECTOMY      TUBAL LIGATION      UPPER GASTROINTESTINAL ENDOSCOPY  2017    normal biopsies    WISDOM TOOTH EXTRACTION Bilateral      Social History   Social History     Substance and Sexual Activity   Alcohol Use Yes    Comment: social     Social History     Substance and Sexual Activity   Drug Use Never     Social History     Tobacco Use   Smoking Status Former    Current packs/day: 0.00    Types: Cigarettes    Quit date:     Years since quittin.0   Smokeless Tobacco Never     Family History   Problem Relation Age of Onset    Basal cell carcinoma Mother         40's    No Known Problems Father     No Known Problems Sister     Vaginal cancer Maternal Grandmother 87    Basal cell carcinoma Maternal Grandfather         Unknown age    Squamous cell carcinoma Maternal Grandfather         Unknown age    No Known Problems Paternal Grandmother     No Known Problems Paternal Grandfather     Colon cancer Maternal Uncle         50's    No Known Problems Paternal Aunt     No Known Problems Daughter     Colon polyps Neg Hx        Meds/Allergies       Current Outpatient Medications:     amLODIPine (NORVASC) 2.5 mg tablet    butalbital-acetaminophen-caffeine  "(FIORICET,ESGIC) -40 mg per tablet    CALCIUM PO    cholecalciferol (VITAMIN D3) 1,000 units tablet    cycloSPORINE (RESTASIS) 0.05 % ophthalmic emulsion    Galcanezumab-gnlm (Emgality) 120 MG/ML SOAJ    levothyroxine 112 mcg tablet    losartan (COZAAR) 25 mg tablet    Multiple Vitamin (multivitamin) tablet    rimegepant sulfate (Nurtec) 75 mg TBDP    tiZANidine (ZANAFLEX) 4 mg tablet    budesonide-formoterol (Symbicort) 160-4.5 mcg/act inhaler    carvedilol (COREG) 6.25 mg tablet    fluticasone (FLONASE) 50 mcg/act nasal spray    levalbuterol (XOPENEX HFA) 45 mcg/act inhaler    polyethylene glycol (MiraLax) 17 GM/SCOOP powder    Current Facility-Administered Medications:     lactated ringers infusion, 125 mL/hr, Intravenous, Continuous    Facility-Administered Medications Ordered in Other Encounters:     lactated ringers infusion, , Intravenous, Continuous PRN, 500 mL at 01/22/25 0918        Objective     /67   Pulse 70   Temp 98.1 °F (36.7 °C) (Temporal)   Resp 22   Ht 5' 1\" (1.549 m)   Wt 55.3 kg (122 lb)   LMP 07/01/2024 Comment: tubal ligation  SpO2 100%   BMI 23.05 kg/m²       PHYSICAL EXAM    Gen: NAD AAOx3  Head: Normocephalic, Atraumatic  CV: S1S2 RRR no m/r/g  CHEST: Clear b/l no c/r/w  ABD: soft, +BS NT/ND no masses  EXT: no edema      ASSESSMENT/PLAN:  This is a 54 y.o. year old female here for colonoscopy, and she is stable and optimized for her procedure.        "

## 2025-01-22 NOTE — ANESTHESIA PREPROCEDURE EVALUATION
Procedure:  COLONOSCOPY    Relevant Problems   ANESTHESIA   (-) History of anesthesia complications      CARDIO   (+) HTN (hypertension)   (+) Migraines   (-) Chest pain   (-) BARDALES (dyspnea on exertion)      ENDO   (+) Hypothyroid      GI/HEPATIC   (+) Rectal bleeding      NEURO/PSYCH   (+) Migraines      PULMONARY   (+) Asthma (Rare inhaler use)   (-) Sleep apnea   (-) URI (upper respiratory infection)        Physical Exam    Airway    Mallampati score: II  TM Distance: >3 FB  Neck ROM: full     Dental       Cardiovascular      Pulmonary      Other Findings  post-pubertal.      Anesthesia Plan  ASA Score- 2     Anesthesia Type- IV sedation with anesthesia with ASA Monitors.         Additional Monitors:     Airway Plan:            Plan Factors-Exercise tolerance (METS): >4 METS.    Chart reviewed. EKG reviewed.  Existing labs reviewed. Patient summary reviewed.                  Induction- intravenous.    Postoperative Plan-         Informed Consent- Anesthetic plan and risks discussed with patient.  I personally reviewed this patient with the CRNA. Discussed and agreed on the Anesthesia Plan with the CRNA..      NPO Status:  No vitals data found for the desired time range.

## 2025-01-22 NOTE — ANESTHESIA POSTPROCEDURE EVALUATION
Post-Op Assessment Note    CV Status:  Stable  Pain Score: 0    Pain management: adequate       Mental Status:  Alert and awake   Hydration Status:  Euvolemic   PONV Controlled:  Controlled   Airway Patency:  Patent     Post Op Vitals Reviewed: Yes    No anethesia notable event occurred.    Staff: CRNA           Last Filed PACU Vitals:  Vitals Value Taken Time   Temp     Pulse 66    BP 80/52    Resp 16    SpO2 98 room air